# Patient Record
Sex: FEMALE | Race: WHITE | URBAN - METROPOLITAN AREA
[De-identification: names, ages, dates, MRNs, and addresses within clinical notes are randomized per-mention and may not be internally consistent; named-entity substitution may affect disease eponyms.]

---

## 2018-04-17 ENCOUNTER — TELEPHONE (OUTPATIENT)
Dept: ORTHOPEDICS | Facility: CLINIC | Age: 73
End: 2018-04-17

## 2018-04-17 NOTE — TELEPHONE ENCOUNTER
Patient was called to schedule an appointment with Dr. Thomas.  She was unavailable so a message was left to call [phone] option 3 and ask for Kirsten with Dr. Thomas's team.    A possible appointment time would be 5/1/2018 at 3:00pm    1:55pm  Patient called back and accepted the 5/1/2018 at 3:00pm appointment with Dr. Thomas at the .  She states that she has appointments at [facility] with Dr. Thomas and Dr. Buchanan.  Dr. Thomas's team at [facility] will be contacted to cancel those appointments at the patients request.  She was given our address and she has our phone number for any future questions.  The patient was agreeable with this course of action.

## 2018-04-30 ENCOUNTER — PRE VISIT (OUTPATIENT)
Dept: ORTHOPEDICS | Facility: CLINIC | Age: 73
End: 2018-04-30

## 2018-04-30 DIAGNOSIS — Z98.890 S/P LEFT KNEE SURGERY: Primary | ICD-10-CM

## 2018-04-30 NOTE — TELEPHONE ENCOUNTER
Patient is s/p left TKA on 9/3/2015.    Patient was last seen on 1/27/2016 by Dr. Allison at [facility].    Patient is coming to clinic for further discussion regarding plan of care.      Per standing orders left knee lateral and 20 degree and long leg alignment radiographs have been ordered and scheduled.    Patient visit type and questionnaires have been reviewed and are correct for this appointment? Yes    Kirsten Escobedo ATC

## 2018-05-01 ENCOUNTER — RADIANT APPOINTMENT (OUTPATIENT)
Dept: GENERAL RADIOLOGY | Facility: CLINIC | Age: 73
End: 2018-05-01
Attending: ORTHOPAEDIC SURGERY
Payer: COMMERCIAL

## 2018-05-01 ENCOUNTER — OFFICE VISIT (OUTPATIENT)
Dept: ORTHOPEDICS | Facility: CLINIC | Age: 73
End: 2018-05-01
Payer: COMMERCIAL

## 2018-05-01 VITALS — WEIGHT: 156 LBS | HEIGHT: 65 IN | BODY MASS INDEX: 25.99 KG/M2

## 2018-05-01 DIAGNOSIS — Z98.890 S/P KNEE SURGERY: ICD-10-CM

## 2018-05-01 DIAGNOSIS — Z98.890 S/P LEFT KNEE SURGERY: ICD-10-CM

## 2018-05-01 DIAGNOSIS — Z96.652 S/P TOTAL KNEE ARTHROPLASTY, LEFT: Primary | ICD-10-CM

## 2018-05-01 DIAGNOSIS — Z96.652 S/P TOTAL KNEE ARTHROPLASTY, LEFT: ICD-10-CM

## 2018-05-01 LAB
APPEARANCE FLD: NORMAL
BASOPHILS # BLD AUTO: 0 10E9/L (ref 0–0.2)
BASOPHILS NFR BLD AUTO: 0.8 %
COLOR FLD: NORMAL
CRP SERPL-MCNC: <2.9 MG/L (ref 0–8)
CRYSTALS SNV MICRO: NORMAL
DIFFERENTIAL METHOD BLD: NORMAL
EOSINOPHIL # BLD AUTO: 0.1 10E9/L (ref 0–0.7)
EOSINOPHIL NFR BLD AUTO: 1.1 %
EOSINOPHIL NFR FLD MANUAL: 2 %
ERYTHROCYTE [DISTWIDTH] IN BLOOD BY AUTOMATED COUNT: 12.7 % (ref 10–15)
ERYTHROCYTE [SEDIMENTATION RATE] IN BLOOD BY WESTERGREN METHOD: 11 MM/H (ref 0–30)
GRAM STN SPEC: NORMAL
GRAM STN SPEC: NORMAL
HCT VFR BLD AUTO: 40.2 % (ref 35–47)
HGB BLD-MCNC: 13.3 G/DL (ref 11.7–15.7)
IMM GRANULOCYTES # BLD: 0 10E9/L (ref 0–0.4)
IMM GRANULOCYTES NFR BLD: 0.2 %
LYMPHOCYTES # BLD AUTO: 2.2 10E9/L (ref 0.8–5.3)
LYMPHOCYTES NFR BLD AUTO: 40.9 %
LYMPHOCYTES NFR FLD MANUAL: 34 %
Lab: NORMAL
MCH RBC QN AUTO: 31.7 PG (ref 26.5–33)
MCHC RBC AUTO-ENTMCNC: 33.1 G/DL (ref 31.5–36.5)
MCV RBC AUTO: 96 FL (ref 78–100)
MONOCYTES # BLD AUTO: 0.5 10E9/L (ref 0–1.3)
MONOCYTES NFR BLD AUTO: 10 %
MONOS+MACROS NFR FLD MANUAL: 38 %
NEUTROPHILS # BLD AUTO: 2.5 10E9/L (ref 1.6–8.3)
NEUTROPHILS NFR BLD AUTO: 47 %
NEUTS BAND NFR FLD MANUAL: 26 %
NRBC # BLD AUTO: 0 10*3/UL
NRBC BLD AUTO-RTO: 0 /100
PLATELET # BLD AUTO: 267 10E9/L (ref 150–450)
RBC # BLD AUTO: 4.19 10E12/L (ref 3.8–5.2)
SPECIMEN SOURCE FLD: NORMAL
SPECIMEN SOURCE: NORMAL
WBC # BLD AUTO: 5.3 10E9/L (ref 4–11)
WBC # FLD AUTO: 856 /UL

## 2018-05-01 RX ORDER — AMOXICILLIN 500 MG
1200 CAPSULE ORAL DAILY
COMMUNITY

## 2018-05-01 RX ORDER — LIDOCAINE HYDROCHLORIDE 10 MG/ML
3 INJECTION, SOLUTION INFILTRATION; PERINEURAL ONCE
Qty: 3 ML | Refills: 0 | OUTPATIENT
Start: 2018-05-01 | End: 2018-05-01

## 2018-05-01 RX ORDER — ROSUVASTATIN CALCIUM 10 MG/1
10 TABLET, COATED ORAL DAILY
COMMUNITY

## 2018-05-01 RX ORDER — VITAMIN B COMPLEX
TABLET ORAL
COMMUNITY

## 2018-05-01 RX ORDER — TRAZODONE HYDROCHLORIDE 50 MG/1
50 TABLET, FILM COATED ORAL AT BEDTIME
COMMUNITY

## 2018-05-01 ASSESSMENT — ENCOUNTER SYMPTOMS
NAUSEA: 0
RECTAL PAIN: 0
ABDOMINAL PAIN: 0
POOR WOUND HEALING: 0
ARTHRALGIAS: 1
LIGHT-HEADEDNESS: 1
NECK PAIN: 1
CHILLS: 0
DIARRHEA: 1
BLOATING: 0
DEPRESSION: 0
INSOMNIA: 1
MYALGIAS: 1
DECREASED CONCENTRATION: 0
HEARTBURN: 1
ORTHOPNEA: 0
WEIGHT LOSS: 0
POLYPHAGIA: 0
BOWEL INCONTINENCE: 0
NIGHT SWEATS: 1
SLEEP DISTURBANCES DUE TO BREATHING: 0
EXERCISE INTOLERANCE: 0
PANIC: 1
BLOOD IN STOOL: 0
SYNCOPE: 0
HYPOTENSION: 0
VOMITING: 0
STIFFNESS: 0
JAUNDICE: 0
PALPITATIONS: 1
NERVOUS/ANXIOUS: 1
HYPERTENSION: 0
NAIL CHANGES: 0
MUSCLE WEAKNESS: 0
MUSCLE CRAMPS: 1
BACK PAIN: 0
WEIGHT GAIN: 0
DECREASED APPETITE: 0
SKIN CHANGES: 0
CONSTIPATION: 1
FATIGUE: 0
FEVER: 0

## 2018-05-01 NOTE — NURSING NOTE
"Reason For Visit:   Chief Complaint   Patient presents with     RECHECK     left knee swelling and pain. s/p left TKA 9/3/15.       Primary MD: Magaly Hillman  Referring MD: TYRONE Vaughan pt    ?  No    Occupation: Retired.  Currently working? No.  Work status?  Retired.    Date of injury: None  Type of injury: Ongoing.    Date of surgery: 9/3/15  Type of surgery: Total knee arthoplasty left    Date of surgery: 5/10/09  Type of surgery:   1.  Right total knee arthroplasty using [employer] triathlon system.   2.  Subtotal synovectomy with biopsies (path pending)    3.  Removal of anterior varicosities x3.     Smoker: No    Ht 1.645 m (5' 4.75\")  Wt 70.8 kg (156 lb)  BMI 26.16 kg/m2    Pain Assessment  Patient Currently in Pain: Yes  0-10 Pain Scale: 7  Primary Pain Location: Knee  Pain Orientation: Left  Pain Descriptors: Aching, Stabbing (Swollen)  Alleviating Factors: Ice  Aggravating Factors: Walking, Standing, Stairs (Increase activity)    Azalea Lozano CMA  5/1/2018      Teaching Flowsheet   Relevant Diagnosis: s/p left total knee arthroplasty  Teaching Topic: left knee aspiration     Person(s) involved in teaching:   Patient     Motivation Level:  Asks Questions: Yes  Eager to Learn: Yes  Cooperative: Yes  Receptive (willing/able to accept information): Yes  Any cultural factors/Oriental orthodox beliefs that may influence understanding or compliance? No       Patient demonstrates understanding of the following:  Reason for the appointment, diagnosis and treatment plan: Yes  Knowledge of proper use of medications and conditions for which they are ordered (with special attention to potential side effects or drug interactions): NA  Which situations necessitate calling provider and whom to contact: Yes       Teaching Concerns Addressed:        Proper use and care of NA (medical equip, care aids, etc.): NA  Nutritional needs and diet plan: NA  Pain management techniques: NA  Wound Care: NA  How and/when to " access community resources: FRANK     Instructional Materials Used/Given: FRANK     [facility]  [address] [address]  Dept: [phone]  ______________________________________________________________________________    Patient: Carmita Gautam   : 1945   MRN: [id number]   May 1, 2018    INVASIVE PROCEDURE SAFETY CHECKLIST    Date: 2018   Procedure:Left Knee Aspiration  Patient Name: Carmita Gautam  MRN: [id number]  YOB: 1945    Action: Complete sections as appropriate. Any discrepancy results in a HARD COPY until resolved.     PRE PROCEDURE:  Patient ID verified with 2 identifiers (name and  or MRN): Yes  Procedure and site verified with patient/designee (when able): Yes  Accurate consent documentation in medical record: Yes  H&P (or appropriate assessment) documented in medical record: Yes  H&P must be up to 20 days prior to procedure and updates within 24 hours of procedure as applicable: Yes  Relevant diagnostic and radiology test results appropriately labeled and displayed as applicable: Yes  Procedure site(s) marked with provider initials: Yes    TIMEOUT:  Time-Out performed immediately prior to starting procedure, including verbal and active participation of all team members addressing the following:Yes  * Correct patient identify  * Confirmed that the correct side and site are marked  * An accurate procedure consent form  * Agreement on the procedure to be done  * Correct patient position  * Relevant images and results are properly labeled and appropriately displayed  * The need to administer antibiotics or fluids for irrigation purposes during the procedure as applicable   * Safety precautions based on patient history or medication use    DURING PROCEDURE: Verification of correct person, site, and procedures any time the responsibility for care of the patient is transferred to another member of the care team.        The following medication was given:     MEDICATION:  Lidocaine without epinephrine  ROUTE: Intra-Articular  SITE: Left Knee  DOSE: 3 mL  LOT #: [id number]  : Oxatis  EXPIRATION DATE: 09/2021  NDC#: 39600-764-62   Was there drug waste? Yes  Amount of drug waste (mL): 27.  Reason for waste:  Single use vial      Azalea Lozano  May 1, 2018

## 2018-05-01 NOTE — MR AVS SNAPSHOT
After Visit Summary   5/1/2018    Carmita Gautam    MRN: [id number]           Patient Information     Date Of Birth          1945        Visit Information        Provider Department      5/1/2018 3:00 PM Vaishali Thomas MD [facility]        Today's Diagnoses     S/P total knee arthroplasty, left    -  1    S/P knee surgery           Follow-ups after your visit        Your next 10 appointments already scheduled     May 08, 2018  1:45 PM CDT   MR KNEE LEFT W/O CONTRAST with [id number]   [facility] ([facility])    [address] [address] [phone]           Take your medicines as usual, unless your doctor tells you not to. Bring a list of your current medicines to your exam (including vitamins, minerals and over-the-counter drugs). Also bring the results of similar scans you may have had.  Please remove any body piercings and hair extensions before you arrive.  Follow your doctor s orders. If you do not, we may have to postpone your exam.  You may or may not receive IV contrast for this exam pending the discretion of the Radiologist.  You do not need to do anything special to prepare.  The MRI machine uses a strong magnet. Please wear clothes without metal (snaps, zippers). A sweatsuit works well, or we may give you a hospital gown.   **IMPORTANT** THE INSTRUCTIONS BELOW ARE ONLY FOR THOSE PATIENTS WHO HAVE BEEN PRESCRIBED SEDATION OR GENERAL ANESTHESIA DURING THEIR MRI PROCEDURE:  IF YOUR DOCTOR PRESCRIBED ORAL SEDATION (take medicine to help you relax during your exam):   You must get the medicine from your doctor (oral medication) before you arrive. Bring the medicine to the exam. Do not take it at home. You ll be told when to take it upon arriving for your exam.   Arrive one hour early. Bring someone who can take you home after the test. Your medicine will make you sleepy. After the exam,  you may not drive, take a bus or take a taxi by yourself.  IF YOUR DOCTOR PRESCRIBED IV SEDATION:   Arrive one hour early. Bring someone who can take you home after the test. Your medicine will make you sleepy. After the exam, you may not drive, take a bus or take a taxi by yourself.   No eating 6 hours before your exam. You may have clear liquids up until 4 hours before your exam. (Clear liquids include water, clear tea, black coffee and fruit juice without pulp.)  IF YOUR DOCTOR PRESCRIBED ANESTHESIA (be asleep for your exam):   Arrive 1 1/2 hours early. Bring someone who can take you home after the test. You may not drive, take a bus or take a taxi by yourself.   No eating 8 hours before your exam. You may have clear liquids up until 4 hours before your exam. (Clear liquids include water, clear tea, black coffee and fruit juice without pulp.)   You will spend four to five hours in the recovery room.  Please call the Imaging Department at your exam site with any questions.              Who to contact     Please call your clinic at [phone] to:    Ask questions about your health    Make or cancel appointments    Discuss your medicines    Learn about your test results    Speak to your doctor            Additional Information About Your Visit        MyChart Information     [employer] is an electronic gateway that provides easy, online access to your medical records. With Dashlane, you can request a clinic appointment, read your test results, renew a prescription or communicate with your care team.     To sign up for [employer] visit the website at [email or web address]   You will be asked to enter the access code listed below, as well as some personal information. Please follow the directions to create your username and password.     Your access code is: [email or web address]  Expires: 2018  6:30 AM     Your access code will  in 90 days. If you need help or a new code, please contact your [facility]  [facility] or call [phone] for assistance.        Care EveryWhere ID     This is your Care EveryWhere ID. This could be used by other organizations to access your [facility] medical records  [id number]        Your Vitals Were     Height BMI (Body Mass Index)                1.645 m (5' 4.75\") 26.16 kg/m2           Blood Pressure from Last 3 Encounters:   09/07/15 110/49    Weight from Last 3 Encounters:   05/01/18 70.8 kg (156 lb)   09/03/15 73.1 kg (161 lb 3.2 oz)              We Performed the Following     Anaerobic bacterial culture     Cell count with differential fluid     Crystal ID synovial fluid     Fluid Culture Aerobic Bacterial     Gram stain     Large Joint/Bursa injection and/or drainage - Unilateral (Shoulder, Knee) [20610]          Today's Medication Changes          These changes are accurate as of 5/1/18 11:59 PM.  If you have any questions, ask your nurse or doctor.               Start taking these medicines.        Dose/Directions    lidocaine 1 % injection   Used for:  S/P total knee arthroplasty, left   Started by:  Vaishali Thomas MD        Dose:  3 mL   3 mLs by INTRA-ARTICULAR route once for 1 dose   Quantity:  3 mL   Refills:  0            Where to get your medicines      Some of these will need a paper prescription and others can be bought over the counter.  Ask your nurse if you have questions.     You don't need a prescription for these medications     lidocaine 1 % injection                Primary Care Provider Office Phone # Fax #    Magaly Hillman [phone]       [address] [address]        Equal Access to Services     TJ GRANDE AH: Hadyash crooks Sonela, waaxda luqadaha, qaybta kaalmada sergey garsia. So [facility] [phone].    ATENCIÓN: Si habla español, tiene a farmer disposición servicios gratuitos de asistencia lingüística. Llame al [phone].    We " comply with applicable federal civil rights laws and [place] laws. We do not discriminate on the basis of race, color, national origin, age, disability, sex, sexual orientation, or gender identity.            Thank you!     Thank you for choosing [facility]  for your care. Our goal is always to provide you with excellent care. Hearing back from our patients is one way we can continue to improve our services. Please take a few minutes to complete the written survey that you may receive in the mail after your visit with us. Thank you!             Your Updated Medication List - Protect others around you: Learn how to safely use, store and throw away your medicines at www.disposemymeds.org.          This list is accurate as of 5/1/18 11:59 PM.  Always use your most recent med list.                   Brand Name Dispense Instructions for use Diagnosis    acetaminophen 650 MG 8 hour tablet     100 tablet    Take 650 mg by mouth every 6 hours    Knee joint replacement status       ASPIRIN PO      Take 325 mg by mouth daily        ATIVAN 1 MG tablet   Generic drug:  LORazepam      Take 1 mg by mouth every 6 hours as needed for anxiety        bisacodyl 5 MG EC tablet    DULCOLAX     Take 5 mg by mouth daily as needed for constipation        CALCIUM 600+D 600-200 MG-UNIT Tabs   Generic drug:  calcium carbonate-vitamin D           CoQ10 100 MG Caps       S/P total knee arthroplasty, left       docusate sodium 100 MG capsule    COLACE     Take 100 mg by mouth 2 times daily as needed for constipation        FIBER PO      Take 1 tablet by mouth daily    S/P total knee arthroplasty, left       fish Oil 1200 MG capsule      Take 1,200 mg by mouth daily    S/P total knee arthroplasty, left       GAVISCON EXTRA STRENGTH 160-105 MG Chew   Generic drug:  Alum hydroxide-Mag carbonate      Take by mouth At Bedtime        hydrOXYzine 10 MG tablet    ATARAX    60 tablet    Take 1 tablet (10 mg) by mouth every 6 hours as  needed for other (adjuvant pain)    Knee joint replacement status       lidocaine 1 % injection     3 mL    3 mLs by INTRA-ARTICULAR route once for 1 dose    S/P total knee arthroplasty, left       magnesium 250 MG tablet      Take 1 tablet by mouth daily        * omeprazole 40 MG capsule    priLOSEC     Take 40 mg by mouth daily        * OMEPRAZOLE PO      Take 20 mg by mouth        oxyCODONE IR 10 MG tablet    ROXICODONE    100 tablet    Take 1-1.5 tablets (10-15 mg) by mouth every 3 hours as needed for moderate to severe pain    Knee joint replacement status       PANTOPRAZOLE SODIUM PO      Take 40 mg by mouth 2 times daily    S/P total knee arthroplasty, left       PAXIL PO      Take 20 mg by mouth daily        rosuvastatin 10 MG tablet    CRESTOR     Take 10 mg by mouth daily    S/P total knee arthroplasty, left       senna-docusate 8.6-50 MG per tablet    SENOKOT-S;PERICOLACE    60 tablet    Take 1-2 tablets by mouth 2 times daily as needed for constipation    Knee joint replacement status       SIMVASTATIN PO      Take 40 mg by mouth At Bedtime        SYNTHROID PO      Take 200 mcg by mouth daily        traZODone 50 MG tablet    DESYREL     Take 50 mg by mouth At Bedtime    S/P total knee arthroplasty, left       VITAMIN D (CHOLECALCIFEROL) PO      Take 1,000 Units by mouth daily    S/P total knee arthroplasty, left       * warfarin 5 MG tablet    COUMADIN    7 tablet    Take 1 tablet (5 mg) by mouth daily    Knee joint replacement status       * warfarin 4 MG tablet    COUMADIN    15 tablet    Take 1 tablet (4 mg) by mouth daily    Knee joint replacement status       * warfarin 4 MG tablet    COUMADIN    30 tablet    Take 1 tablet (4 mg) by mouth daily Or as directed by coumadin clinic.    Status post total left knee replacement       * Notice:  This list has 5 medication(s) that are the same as other medications prescribed for you. Read the directions carefully, and ask your doctor or other care provider  to review them with you.

## 2018-05-01 NOTE — LETTER
5/1/2018       RE: Carmita Gautam  [address]   [place] [address]     Dear Colleague,    Thank you for referring your patient, Carmita Gautam, to the [facility] at [facility]. Please see a copy of my visit note below.    HISTORY OF PRESENT ILLNESS:  Patient is a 73-year-old female who has never completely liked her left knee.  She is status post a left total knee replacement in 09/2015.  She has been plagued with intermittent swelling on and off since that time.  All of her previous notes are at [facility] which I do not have direct access to, but I know her well.      Interestingly, she is status post a right total knee in 05/2009.  At that time she had a subtotal synovectomy with multiple biopsies taken.  The biopsies came back hemosiderin laden synovium that did not fit the pathologic criteria of PVNS.  She has done reasonably well with her right side.      The reason for me seeing her today is that she reports that she was stepping out of a car and made a turn and her knee just blew up within minutes.  That happened approximately a few weeks ago.  She is here for further discussion.      It is true that on one other occasion, I did aspirate her knee and injected it with steroids since her total knee was done in 09/2015.  Again, I do not have full access to the [facility] notes, but that appeared to be serous fluid to my recollection, and her knee did improve with that injection.      PHYSICAL EXAMINATION:  On today's knee reveals a knee that is boggy with a positive fluid wave.  Good straight leg raising effort without a lag, range of motion 0-120.      Kneecap tracks well through an active arc of motion.      IMAGING:  X-rays were taken today.  They show no interval change with previous x-rays.      ASSESSMENT:  I felt that the next step would be to aspirate the knee.  If it remained serous fluid, I would consider injecting steroids but I do  believe that we need a workup for inflammation as well.      PROCEDURE:  Under sterile conditions, patient's knee was aspirated for 50 cc of red fluid that did not clot, so consideration for this being fluid that is suggestive of PVNS was considered.  This was sent for Gram stain, cultures, cell count.      In addition, we obtained blood work, a CRP and sed rate.  The CRP came back as within normal limits of under 2.9.  The sed rate came back as 11, which is within normal limits.  The CBC with diff was all within normal limits.      The results of the fluid are forthcoming.      In light of this aspiration, we did not inject her with steroids.      After discussing this with Dr. Carcamo, we will obtain an MRI of her left knee.  This will be an MRI looking for potential synovitis, in particular PVNS. The MRI should go in mid-thigh of the left knee.  Due to her total knee in place, it should be with a metal subtraction as much as possible.      She will expect a phone call after this is done.  [place] 2 [place].      Room time 25 minutes, consultation time 20.     Answers for HPI/ROS submitted by the patient on 5/1/2018     Again, thank you for allowing me to participate in the care of your patient.      Sincerely,    Vaishali Thomas MD

## 2018-05-02 NOTE — PROGRESS NOTES
HISTORY OF PRESENT ILLNESS:  Patient is a 73-year-old female who has never completely liked her left knee.  She is status post a left total knee replacement in 09/2015.  She has been plagued with intermittent swelling on and off since that time.  All of her previous notes are at [facility] which I do not have direct access to, but I know her well.      Interestingly, she is status post a right total knee in 05/2009.  At that time she had a subtotal synovectomy with multiple biopsies taken.  The biopsies came back hemosiderin laden synovium that did not fit the pathologic criteria of PVNS.  She has done reasonably well with her right side.      The reason for me seeing her today is that she reports that she was stepping out of a car and made a turn and her knee just blew up within minutes.  That happened approximately a few weeks ago.  She is here for further discussion.      It is true that on one other occasion, I did aspirate her knee and injected it with steroids since her total knee was done in 09/2015.  Again, I do not have full access to the [facility] notes, but that appeared to be serous fluid to my recollection, and her knee did improve with that injection.      PHYSICAL EXAMINATION:  On today's knee reveals a knee that is boggy with a positive fluid wave.  Good straight leg raising effort without a lag, range of motion 0-120.      Kneecap tracks well through an active arc of motion.      IMAGING:  X-rays were taken today.  They show no interval change with previous x-rays.      ASSESSMENT:  I felt that the next step would be to aspirate the knee.  If it remained serous fluid, I would consider injecting steroids but I do believe that we need a workup for inflammation as well.      PROCEDURE:  Under sterile conditions, patient's knee was aspirated for 50 cc of red fluid that did not clot, so consideration for this being fluid that is suggestive of PVNS was considered.  This was sent for Gram stain, cultures, cell count.       In addition, we obtained blood work, a CRP and sed rate.  The CRP came back as within normal limits of under 2.9.  The sed rate came back as 11, which is within normal limits.  The CBC with diff was all within normal limits.      The results of the fluid are forthcoming.      In light of this aspiration, we did not inject her with steroids.      After discussing this with Dr. Carcamo, we will obtain an MRI of her left knee.  This will be an MRI looking for potential synovitis, in particular PVNS. The MRI should go in mid-thigh of the left knee.  Due to her total knee in place, it should be with a metal subtraction as much as possible.      She will expect a phone call after this is done.  She knows [place] am [place] of [place] the next 2 weeks.      Room time 25 minutes, consultation time 20.       Answers for HPI/ROS submitted by the patient on 5/1/2018   General Symptoms: Yes  Skin Symptoms: Yes  HENT Symptoms: No  EYE SYMPTOMS: No  HEART SYMPTOMS: Yes  LUNG SYMPTOMS: No  INTESTINAL SYMPTOMS: Yes  URINARY SYMPTOMS: No  GYNECOLOGIC SYMPTOMS: No  BREAST SYMPTOMS: No  SKELETAL SYMPTOMS: Yes  BLOOD SYMPTOMS: No  NERVOUS SYSTEM SYMPTOMS: No  MENTAL HEALTH SYMPTOMS: Yes  Fever: No  Loss of appetite: No  Weight loss: No  Weight gain: No  Fatigue: No  Night sweats: Yes  Chills: No  Increased stress: No  Excessive hunger: No  Changes in hair: No  Changes in moles/birth marks: No  Itching: Yes  Rashes: No  Changes in nails: No  Acne: No  Hair in places you don't want it: No  Change in facial hair: No  Warts: No  Non-healing sores: No  Scarring: No  Flaking of skin: No  Color changes of hands/feet in cold : Yes  Sun sensitivity: No  Skin thickening: No  Chest pain or pressure: Yes  Fast or irregular heartbeat: Yes  Trouble breathing while lying down: No  Fingers or toes appear blue: No  High blood pressure: No  Low blood pressure: No  Fainting: No  Murmurs: No  Pacemaker: No  Varicose veins: Yes  Edema or swelling:  Yes  Wake up at night with shortness of breath: No  Light-headedness: Yes  Exercise intolerance: No  Heart burn or indigestion: Yes  Nausea: No  Vomiting: No  Abdominal pain: No  Bloating: No  Constipation: Yes  Diarrhea: Yes  Blood in stool: No  Black stools: No  Rectal or Anal pain: No  Fecal incontinence: No  Yellowing of skin or eyes: No  Vomit with blood: No  Change in stools: Yes  Back pain: No  Muscle aches: Yes  Neck pain: Yes  Joint pain: Yes  Muscle cramps: Yes  Muscle weakness: No  Joint stiffness: No  Bone fracture: No  Nervous or Anxious: Yes  Depression: No  Trouble sleeping: Yes  Trouble thinking or concentrating: No  Mood changes: No  Panic attacks: Yes

## 2018-05-03 ENCOUNTER — TELEPHONE (OUTPATIENT)
Dept: ORTHOPEDICS | Facility: CLINIC | Age: 73
End: 2018-05-03

## 2018-05-03 NOTE — TELEPHONE ENCOUNTER
Called patient to give her lab results from 5/1/2018.  Dr. Thomas also discussed patient with Dr. Lucero and decided to order MRI for patient for her Left knee.  Order placed.  Patient given scheduling phone number and will schedule this.  We will review results when completed and call with plan of action.

## 2018-05-06 LAB
BACTERIA SPEC CULT: NO GROWTH
Lab: NORMAL
SPECIMEN SOURCE: NORMAL

## 2018-05-08 ENCOUNTER — RADIANT APPOINTMENT (OUTPATIENT)
Dept: MRI IMAGING | Facility: CLINIC | Age: 73
End: 2018-05-08
Attending: ORTHOPAEDIC SURGERY
Payer: COMMERCIAL

## 2018-05-08 DIAGNOSIS — Z96.652 S/P TOTAL KNEE ARTHROPLASTY, LEFT: ICD-10-CM

## 2018-05-15 LAB
BACTERIA SPEC CULT: NORMAL
Lab: NORMAL
SPECIMEN SOURCE: NORMAL

## 2018-05-16 ENCOUNTER — TELEPHONE (OUTPATIENT)
Dept: ORTHOPEDICS | Facility: CLINIC | Age: 73
End: 2018-05-16

## 2018-05-16 NOTE — TELEPHONE ENCOUNTER
Voicemail left for patient regarding an appointment for her to see Dr. Thomas next Tuesday, May 22, 2018.  I have asked her to call back and confirm that 10am would work for her to come in to review MRI results and possible steroid injection.

## 2018-05-17 ENCOUNTER — TELEPHONE (OUTPATIENT)
Dept: ORTHOPEDICS | Facility: CLINIC | Age: 73
End: 2018-05-17

## 2018-05-17 NOTE — TELEPHONE ENCOUNTER
Spoke with patient and confirmed that appointment on Tuesday, May 22, 2018 at 10am for patient to come in and review MRI and get possible knee injection works for patient.  She has our phone number for any further questions or concerns.

## 2018-05-18 ENCOUNTER — PRE VISIT (OUTPATIENT)
Dept: ORTHOPEDICS | Facility: CLINIC | Age: 73
End: 2018-05-18

## 2018-05-18 NOTE — TELEPHONE ENCOUNTER
Patient is s/p left TKA on 9/3/2015.    Patient was last seen on 5/1/2018.    Patient is coming to clinic for further discussion regarding plan of care.  Review MRI and possible left knee injection    No additional orders are needed this visit.     Patient visit type and questionnaires have been reviewed and are correct for this appointment? Yes    Kirsten Escobedo ATC

## 2018-05-22 ENCOUNTER — OFFICE VISIT (OUTPATIENT)
Dept: ORTHOPEDICS | Facility: CLINIC | Age: 73
End: 2018-05-22
Payer: COMMERCIAL

## 2018-05-22 ENCOUNTER — DOCUMENTATION ONLY (OUTPATIENT)
Dept: ORTHOPEDICS | Facility: CLINIC | Age: 73
End: 2018-05-22

## 2018-05-22 DIAGNOSIS — M65.962 SYNOVITIS OF LEFT KNEE: Primary | ICD-10-CM

## 2018-05-22 RX ORDER — TRIAMCINOLONE ACETONIDE 40 MG/ML
40 INJECTION, SUSPENSION INTRA-ARTICULAR; INTRAMUSCULAR ONCE
Qty: 1 ML | Refills: 0 | OUTPATIENT
Start: 2018-05-22 | End: 2018-05-22

## 2018-05-22 RX ORDER — LIDOCAINE HYDROCHLORIDE 10 MG/ML
9 INJECTION, SOLUTION INFILTRATION; PERINEURAL ONCE
Qty: 9 ML | Refills: 0 | OUTPATIENT
Start: 2018-05-22 | End: 2018-05-22

## 2018-05-22 ASSESSMENT — ENCOUNTER SYMPTOMS
INCREASED ENERGY: 0
FEVER: 0
ABDOMINAL PAIN: 0
BLOOD IN STOOL: 0
POLYPHAGIA: 0
STIFFNESS: 1
DOUBLE VISION: 0
MUSCLE WEAKNESS: 0
MUSCLE CRAMPS: 1
ORTHOPNEA: 0
FATIGUE: 0
ARTHRALGIAS: 1
DIARRHEA: 0
HYPOTENSION: 0
DECREASED APPETITE: 0
RECTAL PAIN: 0
EYE PAIN: 0
EYE WATERING: 0
PALPITATIONS: 0
BACK PAIN: 0
MYALGIAS: 0
VOMITING: 0
HYPERTENSION: 0
WEIGHT LOSS: 0
NECK PAIN: 0
ALTERED TEMPERATURE REGULATION: 0
EYE IRRITATION: 0
JOINT SWELLING: 0
NIGHT SWEATS: 1
BOWEL INCONTINENCE: 0
LEG PAIN: 1
BLOATING: 1
HEARTBURN: 1
CONSTIPATION: 0
HALLUCINATIONS: 0
CHILLS: 0
JAUNDICE: 0
SYNCOPE: 0
EXERCISE INTOLERANCE: 0
LIGHT-HEADEDNESS: 1
EYE REDNESS: 1
WEIGHT GAIN: 0
POLYDIPSIA: 0
SLEEP DISTURBANCES DUE TO BREATHING: 0
NAUSEA: 0

## 2018-05-22 NOTE — PROGRESS NOTES
HISTORY OF PRESENT ILLNESS:  Patient is a 73-year-old female who has had chronic swelling of her left knee.  She is status post a total knee replacement on the left side in 2015.  When I removed fluid a week ago, it was a bloody, but did not clot.  Consideration for PVNS was discussed.  On the basis of Dr. Carcamo's recommendation, an MRI was obtained.  The MRI suggests chronic synovitis which they read as mild, but with a moderate effusion.      What is interesting in her history is she had recurrent effusions in her right knee.  She underwent a right total knee replacement in 2009.  At that time her synovium did look hemosiderin laden but not villous.  Multiple biopsies were obtained and the pathology report did confirm hemosiderin-laden synovitis but did not fit the true criteria of PVNS due to lack of nodularity.  That right knee has done well since that time.      The patient does not take much in terms of nonsteroidals, an occasional Motrin or Naprosyn once or twice a week; however, she does take 325 mg of aspirin a day.  This was based on recommendation by her primary care physician due to potential precursor of stroke as viewed by spots on her brain and on an MRI.  She does have 2 sisters who have also had strokes.  One is 94 years old.      Based on this, I would recommend that we scope her knee, biopsy the synovium, and do a subtotal synovectomy.  I would not recommend that she get off aspirin and I would recommend that we do the surgery with aspirin present.      This was discussed with her in the presence of her .      Because she has several weeks where she is trying to get her house ready to host her granddaughter's graduation from high school party,  she is asking to inject her knee with steroids today.  This has helped in the past and I am in agreement with that.      PROCEDURE:  Under sterile conditions, patient's left knee was injected with 10 cc of lidocaine and 1 cc of Kenalog 40 mg per  mL.  The patient had improved pain relief upon leaving the clinic.  Will follow up with me at the time of surgery.       Answers for HPI/ROS submitted by the patient on 5/22/2018   General Symptoms: Yes  Skin Symptoms: No  HENT Symptoms: No  EYE SYMPTOMS: Yes  HEART SYMPTOMS: Yes  LUNG SYMPTOMS: No  INTESTINAL SYMPTOMS: Yes  URINARY SYMPTOMS: No  GYNECOLOGIC SYMPTOMS: No  BREAST SYMPTOMS: No  SKELETAL SYMPTOMS: Yes  BLOOD SYMPTOMS: No  NERVOUS SYSTEM SYMPTOMS: No  MENTAL HEALTH SYMPTOMS: No  Fever: No  Loss of appetite: No  Weight loss: No  Weight gain: No  Fatigue: No  Night sweats: Yes  Chills: No  Increased stress: No  Excessive hunger: No  Excessive thirst: No  Feeling hot or cold when others believe the temperature is normal: No  Loss of height: No  Post-operative complications: No  Surgical site pain: Yes  Hallucinations: No  Change in or Loss of Energy: No  Hyperactivity: No  Confusion: No  Eye pain: No  Vision loss: No  Dry eyes: No  Watery eyes: No  Eye bulging: No  Double vision: No  Flashing of lights: No  Spots: No  Floaters: Yes  Redness: Yes  Crossed eyes: No  Tunnel Vision: No  Yellowing of eyes: No  Eye irritation: No  Fast or irregular heartbeat: No  Pain in legs with walking: Yes  Trouble breathing while lying down: No  Fingers or toes appear blue: No  High blood pressure: No  Low blood pressure: No  Fainting: No  Murmurs: No  Pacemaker: No  Varicose veins: Yes  Wake up at night with shortness of breath: No  Light-headedness: Yes  Exercise intolerance: No  Heart burn or indigestion: Yes  Nausea: No  Vomiting: No  Abdominal pain: No  Bloating: Yes  Constipation: No  Diarrhea: No  Blood in stool: No  Black stools: No  Rectal or Anal pain: No  Fecal incontinence: No  Yellowing of skin or eyes: No  Vomit with blood: No  Change in stools: No  Back pain: No  Muscle aches: No  Neck pain: No  Swollen joints: No  Joint pain: Yes  Bone pain: Yes  Muscle cramps: Yes  Muscle weakness: No  Joint stiffness:  Yes  Bone fracture: No

## 2018-05-22 NOTE — NURSING NOTE
Reason For Visit:   Chief Complaint   Patient presents with     RECHECK     follow up left knee MRI 5/8/18. Patient states left knee is still swollen.       Primary MD: Magaly Hillman  Referring MD: TYRONE Vaughan pt     ?  No     Occupation: Retired.  Currently working? No.  Work status?  Retired.     Date of injury: None  Type of injury: Ongoing.     Date of surgery: 9/3/15  Type of surgery: Total knee arthoplasty left     Date of surgery: 5/10/09  Type of surgery:   1.  Right total knee arthroplasty using [employer] system.   2.  Subtotal synovectomy with biopsies (path pending)    3.  Removal of anterior varicosities x3.     Smoker: No    There were no vitals taken for this visit.    Pain Assessment  Patient Currently in Pain: Yes  0-10 Pain Scale: 5  Primary Pain Location: Knee  Pain Orientation: Left  Pain Descriptors: Aching  Alleviating Factors: Ice  Aggravating Factors: Standing, Walking    Azalea Lozano CMA  5/22/2018      Teaching Flowsheet   Relevant Diagnosis: Left Knee Arthritis   Teaching Topic: Steroid Injection to Left Knee     Person(s) involved in teaching:   Patient     Motivation Level:  Asks Questions: Yes  Eager to Learn: Yes  Cooperative: Yes  Receptive (willing/able to accept information): Yes  Any cultural factors/Latter-day beliefs that may influence understanding or compliance? No     Patient demonstrates understanding of the following:  Reason for the appointment, diagnosis and treatment plan: Yes  Knowledge of proper use of medications and conditions for which they are ordered (with special attention to potential side effects or drug interactions): Yes  Which situations necessitate calling provider and whom to contact: Yes     Teaching Concerns Addressed: NA     Proper use and care of NA (medical equip, care aids, etc.): NA  Nutritional needs and diet plan: NA  Pain management techniques: NA  Wound Care: NA  How and/when to access community resources: NA      Instructional Materials Used/Given: Verbal Instructions           [facility]  [address] [address]  Dept: [phone]  ______________________________________________________________________________    Patient: Carmita Gautam   : 1945   MRN: [id number]   May 22, 2018    INVASIVE PROCEDURE SAFETY CHECKLIST    Date: 2018   Procedure: Steroid Injection to Left Knee  Patient Name: Carmita Gautam  MRN: [id number]  YOB: 1945    Action: Complete sections as appropriate. Any discrepancy results in a HARD COPY until resolved.     PRE PROCEDURE:  Patient ID verified with 2 identifiers (name and  or MRN): Yes  Procedure and site verified with patient/designee (when able): Yes  Accurate consent documentation in medical record: Yes  H&P (or appropriate assessment) documented in medical record: NA  H&P must be up to 20 days prior to procedure and updates within 24 hours of procedure as applicable: NA  Relevant diagnostic and radiology test results appropriately labeled and displayed as applicable: NA  Procedure site(s) marked with provider initials: NA    TIMEOUT:  Time-Out performed immediately prior to starting procedure, including verbal and active participation of all team members addressing the following:Yes  * Correct patient identify  * Confirmed that the correct side and site are marked  * An accurate procedure consent form  * Agreement on the procedure to be done  * Correct patient position  * Relevant images and results are properly labeled and appropriately displayed  * The need to administer antibiotics or fluids for irrigation purposes during the procedure as applicable   * Safety precautions based on patient history or medication use    DURING PROCEDURE: Verification of correct person, site, and procedures any time the responsibility for care of the patient is transferred to another member of the care team.            The following medications was given:     MEDICATION:  Kenalog 40mg/mL  ROUTE: Intra-Articular  SITE: Left Knee  DOSE: 1 mL  LOT #: [id number]  : [employer]  EXPIRATION DATE: 12/2019  NDC#: [address]-2   Was there drug waste? No    MEDICATION:  Lidocaine 1% without epinephrine  ROUTE:  Intra-Articular  SITE: Left Knee  DOSE: 9 mL  LOT #: [id number]  : [employer]  EXPIRATION DATE: 09/2021  NDC#: 16655-188-57   Was there drug waste? Yes  Amount of drug waste (mL): 21 mL.  Reason for waste:  Single use vial    Azalea Lozano CMA  May 22, 2018

## 2018-05-22 NOTE — PROGRESS NOTES
Confirmed surgery with patient for a Left knee subtotal synovialectomy with Dr Thomas on 6/8 at the [facility]. Gave patient packet and soap. Patient will get her pre op H&P done with her primary MD and will await the nurse phone call to touch base about surgery and answer any questions that she may have. Patient will also wait for the pre op nurse phone call in regards to arrival time for surgery. Patient is on a full aspirin daily and understands will need to be off that 10-14 days prior and the nurse will go over that also with her, she will let her primary MD know also. Patient understands and agrees with this plan.

## 2018-05-22 NOTE — LETTER
5/22/2018       RE: Carmita Gautam  [address]   [place] [address]     Dear Colleague,    Thank you for referring your patient, Carmita Gautam, to the [facility] at [facility]. Please see a copy of my visit note below.    HISTORY OF PRESENT ILLNESS:  Patient is a 73-year-old female who has had chronic swelling of her left knee.  She is status post a total knee replacement on the left side in 2015.  When I removed fluid a week ago, it was a bloody, but did not clot.  Consideration for PVNS was discussed.  On the basis of Dr. Carcamo's recommendation, an MRI was obtained.  The MRI suggests chronic synovitis which they read as mild, but with a moderate effusion.      What is interesting in her history is she had recurrent effusions in her right knee.  She underwent a right total knee replacement in 2009.  At that time her synovium did look hemosiderin laden but not villous.  Multiple biopsies were obtained and the pathology report did confirm hemosiderin-laden synovitis but did not fit the true criteria of PVNS due to lack of nodularity.  That right knee has done well since that time.      The patient does not take much in terms of nonsteroidals, an occasional Motrin or Naprosyn once or twice a week; however, she does take 325 mg of aspirin a day.  This was based on recommendation by her primary care physician due to potential precursor of stroke as viewed by spots on her brain and on an MRI.  She does have 2 sisters who have also had strokes.  One is 94 years old.      Based on this, I would recommend that we scope her knee, biopsy the synovium, and do a subtotal synovectomy.  I would not recommend that she get off aspirin and I would recommend that we do the surgery with aspirin present.      This was discussed with her in the presence of her .      Because she has several weeks where she is trying to get her house ready to host  her granddaughter's graduation from high school party,  she is asking to inject her knee with steroids today.  This has helped in the past and I am in agreement with that.      PROCEDURE:  Under sterile conditions, patient's left knee was injected with 10 cc of lidocaine and 1 cc of Kenalog 40 mg per mL.  The patient had improved pain relief upon leaving the clinic.  Will follow up with me at the time of surgery.     Again, thank you for allowing me to participate in the care of your patient.      Sincerely,    Vaishali Thomas MD

## 2018-05-22 NOTE — MR AVS SNAPSHOT
After Visit Summary   2018    Carmita Gautam    MRN: [id number]           Patient Information     Date Of Birth          1945        Visit Information        Provider Department      2018 10:00 AM Vaishali Thomas MD [facility]        Today's Diagnoses     Synovitis of left knee    -  1       Follow-ups after your visit        Your next 10 appointments already scheduled     2018   Procedure with Vaishali Thomas MD   [facility] ([facility])    [address] [place] [address]  5th [facility] [id number] [phone]           Located in the Clinics and Surgery Center at [address] [place] [address].   parking is very convenient and highly recommended.  is a $6 flat rate fee.  Both  and self parkers should enter the main arrival plaza from [facility]; parking attendants will direct you based on your parking preference.              Who to contact     Please call your clinic at [phone] to:    Ask questions about your health    Make or cancel appointments    Discuss your medicines    Learn about your test results    Speak to your doctor            Additional Information About Your Visit        MyChart Information     [email or web address] is an electronic gateway that provides easy, online access to your medical records. With [email or web address], you can request a clinic appointment, read your test results, renew a prescription or communicate with your care team.     To sign up for [email or web address] visit the website at [email or web address]   You will be asked to enter the access code listed below, as well as some personal information. Please follow the directions to create your username and password.     Your access code is: [email or web address]  Expires: 2018  6:30 AM     Your access code will  in 90 days. If you need help or a new code, please contact your [facility] or call [phone] for assistance.        Care EveryWhere ID     This is your Care EveryWhere ID. This could be used by other organizations to access your [facility] medical records  [id number]         Blood Pressure from Last 3 Encounters:   09/07/15 110/49    Weight from Last 3 Encounters:   05/01/18 70.8 kg (156 lb)   09/03/15 73.1 kg (161 lb 3.2 oz)              We Performed the Following     Large Joint/Bursa injection and/or drainage - Unilateral (Shoulder, Knee) [20610]     Emma-Operative Worksheet          Today's Medication Changes          These changes are accurate as of 5/22/18 11:59 PM.  If you have any questions, ask your nurse or doctor.               Start taking these medicines.        Dose/Directions    lidocaine 1 % injection   Used for:  Synovitis of left knee   Started by:  Vaishali Thomas MD        Dose:  9 mL   9 mLs by INTRA-ARTICULAR route once for 1 dose   Quantity:  9 mL   Refills:  0       triamcinolone acetonide 40 MG/ML injection   Commonly known as:  KENALOG   Used for:  Synovitis of left knee   Started by:  Vaishali Thomas MD        Dose:  40 mg   1 mL (40 mg) by INTRA-ARTICULAR route once for 1 dose   Quantity:  1 mL   Refills:  0            Where to get your medicines      Some of these will need a paper prescription and others can be bought over the counter.  Ask your nurse if you have questions.     You don't need a prescription for these medications     lidocaine 1 % injection    triamcinolone acetonide 40 MG/ML injection                Primary Care Provider Office Phone # Fax #    Magaly Hillman [phone]       [address] [address]        Equal Access to Services     [facility]: Esperanza Toledo, anshu chambers, sergey savage. So [facility] [phone].    ATENCIÓN: Si habla español, tiene a farmer disposición  servicios gratuitos de asistencia lingüística. Gali luna [phone].    We comply with applicable federal civil rights laws and [place] laws. We do not discriminate on the basis of race, color, national origin, age, disability, sex, sexual orientation, or gender identity.            Thank you!     Thank you for choosing [facility]  for your care. Our goal is always to provide you with excellent care. Hearing back from our patients is one way we can continue to improve our services. Please take a few minutes to complete the written survey that you may receive in the mail after your visit with us. Thank you!             Your Updated Medication List - Protect others around you: Learn how to safely use, store and throw away your medicines at www.disposemymeds.org.          This list is accurate as of 5/22/18 11:59 PM.  Always use your most recent med list.                   Brand Name Dispense Instructions for use Diagnosis    acetaminophen 650 MG 8 hour tablet     100 tablet    Take 650 mg by mouth every 6 hours    Knee joint replacement status       ASPIRIN PO      Take 325 mg by mouth daily        ATIVAN 1 MG tablet   Generic drug:  LORazepam      Take 1 mg by mouth every 6 hours as needed for anxiety        bisacodyl 5 MG EC tablet    DULCOLAX     Take 5 mg by mouth daily as needed for constipation        CALCIUM 600+D 600-200 MG-UNIT Tabs   Generic drug:  calcium carbonate-vitamin D           CoQ10 100 MG Caps       S/P total knee arthroplasty, left       docusate sodium 100 MG capsule    COLACE     Take 100 mg by mouth 2 times daily as needed for constipation        FIBER PO      Take 1 tablet by mouth daily    S/P total knee arthroplasty, left       fish Oil 1200 MG capsule      Take 1,200 mg by mouth daily    S/P total knee arthroplasty, left       GAVISCON EXTRA STRENGTH 160-105 MG Chew   Generic drug:  Alum hydroxide-Mag carbonate      Take by mouth At Bedtime        hydrOXYzine 10 MG  tablet    ATARAX    60 tablet    Take 1 tablet (10 mg) by mouth every 6 hours as needed for other (adjuvant pain)    Knee joint replacement status       lidocaine 1 % injection     9 mL    9 mLs by INTRA-ARTICULAR route once for 1 dose    Synovitis of left knee       magnesium 250 MG tablet      Take 1 tablet by mouth daily        * omeprazole 40 MG capsule    priLOSEC     Take 40 mg by mouth daily        * OMEPRAZOLE PO      Take 20 mg by mouth        oxyCODONE IR 10 MG tablet    ROXICODONE    100 tablet    Take 1-1.5 tablets (10-15 mg) by mouth every 3 hours as needed for moderate to severe pain    Knee joint replacement status       PANTOPRAZOLE SODIUM PO      Take 40 mg by mouth 2 times daily    S/P total knee arthroplasty, left       PAXIL PO      Take 20 mg by mouth daily        rosuvastatin 10 MG tablet    CRESTOR     Take 10 mg by mouth daily    S/P total knee arthroplasty, left       senna-docusate 8.6-50 MG per tablet    SENOKOT-S;PERICOLACE    60 tablet    Take 1-2 tablets by mouth 2 times daily as needed for constipation    Knee joint replacement status       SIMVASTATIN PO      Take 40 mg by mouth At Bedtime        SYNTHROID PO      Take 200 mcg by mouth daily        traZODone 50 MG tablet    DESYREL     Take 50 mg by mouth At Bedtime    S/P total knee arthroplasty, left       triamcinolone acetonide 40 MG/ML injection    KENALOG    1 mL    1 mL (40 mg) by INTRA-ARTICULAR route once for 1 dose    Synovitis of left knee       VITAMIN D (CHOLECALCIFEROL) PO      Take 1,000 Units by mouth daily    S/P total knee arthroplasty, left       * warfarin 5 MG tablet    COUMADIN    7 tablet    Take 1 tablet (5 mg) by mouth daily    Knee joint replacement status       * warfarin 4 MG tablet    COUMADIN    15 tablet    Take 1 tablet (4 mg) by mouth daily    Knee joint replacement status       * warfarin 4 MG tablet    COUMADIN    30 tablet    Take 1 tablet (4 mg) by mouth daily Or as directed by coumadin clinic.     Status post total left knee replacement       * Notice:  This list has 5 medication(s) that are the same as other medications prescribed for you. Read the directions carefully, and ask your doctor or other care provider to review them with you.

## 2018-05-24 ENCOUNTER — TELEPHONE (OUTPATIENT)
Dept: ORTHOPEDICS | Facility: CLINIC | Age: 73
End: 2018-05-24

## 2018-05-24 NOTE — TELEPHONE ENCOUNTER
Pre-op education done over the phone with patient.  She is scheduled on 6/8/18 with Dr. Thomas for left knee synovial biopsy, subtotal synovectomy    Patient aware of surgery location.  She was instructed to stop her aspirin prior to surgery but per Dr. Thomas's note she should continue to take this until surgery.  This will be clarified with Dr. Thomas and I will call the patient back with her recommendation.  I did instruct her to stop her fish oil.    All questions answered. She has our phone number for further questions/concerns.

## 2018-05-25 NOTE — TELEPHONE ENCOUNTER
Clarified with Dr. Thomas that patient should continue to take her 325mg aspirin until the day before surgery.  She should not take it the morning of surgery.  Voicemail left with this information for patient (Patient had OK'd to leave a message when I spoke with her yesterday).  Clinic phone number left for any further questions or concerns.

## 2018-06-07 ENCOUNTER — ANESTHESIA EVENT (OUTPATIENT)
Dept: SURGERY | Facility: AMBULATORY SURGERY CENTER | Age: 73
End: 2018-06-07

## 2018-06-08 ENCOUNTER — SURGERY (OUTPATIENT)
Age: 73
End: 2018-06-08

## 2018-06-08 ENCOUNTER — ANESTHESIA (OUTPATIENT)
Dept: SURGERY | Facility: AMBULATORY SURGERY CENTER | Age: 73
End: 2018-06-08

## 2018-06-08 ENCOUNTER — HOSPITAL ENCOUNTER (OUTPATIENT)
Facility: AMBULATORY SURGERY CENTER | Age: 73
End: 2018-06-08
Attending: ORTHOPAEDIC SURGERY
Payer: COMMERCIAL

## 2018-06-08 VITALS
WEIGHT: 155.4 LBS | TEMPERATURE: 98 F | OXYGEN SATURATION: 100 % | SYSTOLIC BLOOD PRESSURE: 138 MMHG | HEIGHT: 65 IN | BODY MASS INDEX: 25.89 KG/M2 | DIASTOLIC BLOOD PRESSURE: 80 MMHG | HEART RATE: 51 BPM | RESPIRATION RATE: 16 BRPM

## 2018-06-08 DIAGNOSIS — Z98.890 STATUS POST KNEE SURGERY: Primary | ICD-10-CM

## 2018-06-08 RX ORDER — ONDANSETRON 2 MG/ML
4 INJECTION INTRAMUSCULAR; INTRAVENOUS EVERY 30 MIN PRN
Status: DISCONTINUED | OUTPATIENT
Start: 2018-06-08 | End: 2018-06-09 | Stop reason: HOSPADM

## 2018-06-08 RX ORDER — DEXAMETHASONE SODIUM PHOSPHATE 4 MG/ML
INJECTION, SOLUTION INTRA-ARTICULAR; INTRALESIONAL; INTRAMUSCULAR; INTRAVENOUS; SOFT TISSUE PRN
Status: DISCONTINUED | OUTPATIENT
Start: 2018-06-08 | End: 2018-06-08

## 2018-06-08 RX ORDER — ONDANSETRON 4 MG/1
4 TABLET, ORALLY DISINTEGRATING ORAL EVERY 30 MIN PRN
Status: DISCONTINUED | OUTPATIENT
Start: 2018-06-08 | End: 2018-06-09 | Stop reason: HOSPADM

## 2018-06-08 RX ORDER — ACETAMINOPHEN 325 MG/1
325 TABLET ORAL EVERY 4 HOURS
Qty: 40 TABLET | Refills: 0 | Status: SHIPPED | OUTPATIENT
Start: 2018-06-08

## 2018-06-08 RX ORDER — SODIUM CHLORIDE, SODIUM LACTATE, POTASSIUM CHLORIDE, CALCIUM CHLORIDE 600; 310; 30; 20 MG/100ML; MG/100ML; MG/100ML; MG/100ML
INJECTION, SOLUTION INTRAVENOUS CONTINUOUS
Status: DISCONTINUED | OUTPATIENT
Start: 2018-06-08 | End: 2018-06-08 | Stop reason: HOSPADM

## 2018-06-08 RX ORDER — OXYCODONE HYDROCHLORIDE 5 MG/1
5 TABLET ORAL EVERY 4 HOURS PRN
Status: DISCONTINUED | OUTPATIENT
Start: 2018-06-08 | End: 2018-06-09 | Stop reason: HOSPADM

## 2018-06-08 RX ORDER — LIDOCAINE 40 MG/G
CREAM TOPICAL
Status: DISCONTINUED | OUTPATIENT
Start: 2018-06-08 | End: 2018-06-08 | Stop reason: HOSPADM

## 2018-06-08 RX ORDER — SODIUM CHLORIDE, SODIUM LACTATE, POTASSIUM CHLORIDE, CALCIUM CHLORIDE 600; 310; 30; 20 MG/100ML; MG/100ML; MG/100ML; MG/100ML
INJECTION, SOLUTION INTRAVENOUS CONTINUOUS
Status: DISCONTINUED | OUTPATIENT
Start: 2018-06-08 | End: 2018-06-09 | Stop reason: HOSPADM

## 2018-06-08 RX ORDER — HYDROCODONE BITARTRATE AND ACETAMINOPHEN 5; 325 MG/1; MG/1
1 TABLET ORAL EVERY 4 HOURS PRN
Qty: 12 TABLET | Refills: 0 | Status: SHIPPED | OUTPATIENT
Start: 2018-06-08 | End: 2018-06-21

## 2018-06-08 RX ORDER — GLYCOPYRROLATE 0.2 MG/ML
INJECTION, SOLUTION INTRAMUSCULAR; INTRAVENOUS PRN
Status: DISCONTINUED | OUTPATIENT
Start: 2018-06-08 | End: 2018-06-08

## 2018-06-08 RX ORDER — FENTANYL CITRATE 50 UG/ML
INJECTION, SOLUTION INTRAMUSCULAR; INTRAVENOUS PRN
Status: DISCONTINUED | OUTPATIENT
Start: 2018-06-08 | End: 2018-06-08

## 2018-06-08 RX ORDER — EPHEDRINE SULFATE 50 MG/ML
INJECTION, SOLUTION INTRAMUSCULAR; INTRAVENOUS; SUBCUTANEOUS PRN
Status: DISCONTINUED | OUTPATIENT
Start: 2018-06-08 | End: 2018-06-08

## 2018-06-08 RX ORDER — ACETAMINOPHEN 325 MG/1
975 TABLET ORAL ONCE
Status: COMPLETED | OUTPATIENT
Start: 2018-06-08 | End: 2018-06-08

## 2018-06-08 RX ORDER — PROPOFOL 10 MG/ML
INJECTION, EMULSION INTRAVENOUS CONTINUOUS PRN
Status: DISCONTINUED | OUTPATIENT
Start: 2018-06-08 | End: 2018-06-08

## 2018-06-08 RX ORDER — FENTANYL CITRATE 50 UG/ML
25-50 INJECTION, SOLUTION INTRAMUSCULAR; INTRAVENOUS
Status: DISCONTINUED | OUTPATIENT
Start: 2018-06-08 | End: 2018-06-08 | Stop reason: HOSPADM

## 2018-06-08 RX ORDER — OXYCODONE HYDROCHLORIDE 5 MG/1
5 TABLET ORAL ONCE
Status: COMPLETED | OUTPATIENT
Start: 2018-06-08 | End: 2018-06-08

## 2018-06-08 RX ORDER — ONDANSETRON 2 MG/ML
INJECTION INTRAMUSCULAR; INTRAVENOUS PRN
Status: DISCONTINUED | OUTPATIENT
Start: 2018-06-08 | End: 2018-06-08

## 2018-06-08 RX ORDER — FLUMAZENIL 0.1 MG/ML
0.2 INJECTION, SOLUTION INTRAVENOUS
Status: DISCONTINUED | OUTPATIENT
Start: 2018-06-08 | End: 2018-06-08 | Stop reason: HOSPADM

## 2018-06-08 RX ORDER — NALOXONE HYDROCHLORIDE 0.4 MG/ML
.1-.4 INJECTION, SOLUTION INTRAMUSCULAR; INTRAVENOUS; SUBCUTANEOUS
Status: DISCONTINUED | OUTPATIENT
Start: 2018-06-08 | End: 2018-06-08 | Stop reason: HOSPADM

## 2018-06-08 RX ORDER — MEPERIDINE HYDROCHLORIDE 25 MG/ML
12.5 INJECTION INTRAMUSCULAR; INTRAVENOUS; SUBCUTANEOUS
Status: DISCONTINUED | OUTPATIENT
Start: 2018-06-08 | End: 2018-06-09 | Stop reason: HOSPADM

## 2018-06-08 RX ORDER — NALOXONE HYDROCHLORIDE 0.4 MG/ML
.1-.4 INJECTION, SOLUTION INTRAMUSCULAR; INTRAVENOUS; SUBCUTANEOUS
Status: DISCONTINUED | OUTPATIENT
Start: 2018-06-08 | End: 2018-06-09 | Stop reason: HOSPADM

## 2018-06-08 RX ORDER — PROPOFOL 10 MG/ML
INJECTION, EMULSION INTRAVENOUS PRN
Status: DISCONTINUED | OUTPATIENT
Start: 2018-06-08 | End: 2018-06-08

## 2018-06-08 RX ORDER — LIDOCAINE HYDROCHLORIDE 20 MG/ML
INJECTION, SOLUTION INFILTRATION; PERINEURAL PRN
Status: DISCONTINUED | OUTPATIENT
Start: 2018-06-08 | End: 2018-06-08

## 2018-06-08 RX ADMIN — FENTANYL CITRATE 25 MCG: 50 INJECTION, SOLUTION INTRAMUSCULAR; INTRAVENOUS at 13:16

## 2018-06-08 RX ADMIN — PROPOFOL 100 MG: 10 INJECTION, EMULSION INTRAVENOUS at 12:23

## 2018-06-08 RX ADMIN — Medication 0.25 MG: at 12:56

## 2018-06-08 RX ADMIN — EPHEDRINE SULFATE 5 MG: 50 INJECTION, SOLUTION INTRAMUSCULAR; INTRAVENOUS; SUBCUTANEOUS at 12:33

## 2018-06-08 RX ADMIN — FENTANYL CITRATE 50 MCG: 50 INJECTION, SOLUTION INTRAMUSCULAR; INTRAVENOUS at 12:21

## 2018-06-08 RX ADMIN — PROPOFOL 100 MCG/KG/MIN: 10 INJECTION, EMULSION INTRAVENOUS at 12:01

## 2018-06-08 RX ADMIN — FENTANYL CITRATE 50 MCG: 50 INJECTION, SOLUTION INTRAMUSCULAR; INTRAVENOUS at 12:23

## 2018-06-08 RX ADMIN — GLYCOPYRROLATE 0.2 MG: 0.2 INJECTION, SOLUTION INTRAMUSCULAR; INTRAVENOUS at 12:08

## 2018-06-08 RX ADMIN — PROPOFOL 160 MG: 10 INJECTION, EMULSION INTRAVENOUS at 12:04

## 2018-06-08 RX ADMIN — OXYCODONE HYDROCHLORIDE 5 MG: 5 TABLET ORAL at 14:08

## 2018-06-08 RX ADMIN — FENTANYL CITRATE 25 MCG: 50 INJECTION, SOLUTION INTRAMUSCULAR; INTRAVENOUS at 13:09

## 2018-06-08 RX ADMIN — FENTANYL CITRATE 25 MCG: 50 INJECTION, SOLUTION INTRAMUSCULAR; INTRAVENOUS at 13:32

## 2018-06-08 RX ADMIN — ONDANSETRON 4 MG: 2 INJECTION INTRAMUSCULAR; INTRAVENOUS at 12:04

## 2018-06-08 RX ADMIN — LIDOCAINE HYDROCHLORIDE 100 MG: 20 INJECTION, SOLUTION INFILTRATION; PERINEURAL at 12:01

## 2018-06-08 RX ADMIN — DEXAMETHASONE SODIUM PHOSPHATE 4 MG: 4 INJECTION, SOLUTION INTRA-ARTICULAR; INTRALESIONAL; INTRAMUSCULAR; INTRAVENOUS; SOFT TISSUE at 12:04

## 2018-06-08 RX ADMIN — Medication 0.25 MG: at 12:44

## 2018-06-08 RX ADMIN — SODIUM CHLORIDE, SODIUM LACTATE, POTASSIUM CHLORIDE, CALCIUM CHLORIDE: 600; 310; 30; 20 INJECTION, SOLUTION INTRAVENOUS at 11:54

## 2018-06-08 RX ADMIN — ACETAMINOPHEN 975 MG: 325 TABLET ORAL at 10:18

## 2018-06-08 ASSESSMENT — LIFESTYLE VARIABLES: TOBACCO_USE: 1

## 2018-06-08 NOTE — ANESTHESIA CARE TRANSFER NOTE
Patient: Carmita Gautam    Procedure(s):  Left Knee Synovial Biopsy, Subtotal Synovectomy (Arthroscopic) - Wound Class: I-Clean   - Wound Class: I-Clean    Diagnosis: Chronic Synovitis, Consider Pigmented Villonodular Synovitis  Diagnosis Additional Information: No value filed.    Anesthesia Type:   General     Note:  Airway :Room Air  Patient transferred to:PACU  Comments: Patient awake and breathing spont. VSS. No complaints of pain or nausea. Report to RNHandoff Report: Identifed the Patient, Identified the Reponsible Provider, Reviewed the pertinent medical history, Discussed the surgical course, Reviewed Intra-OP anesthesia mangement and issues during anesthesia, Set expectations for post-procedure period and Allowed opportunity for questions and acknowledgement of understanding      Vitals: (Last set prior to Anesthesia Care Transfer)    CRNA VITALS  6/8/2018 1227 - 6/8/2018 1303      6/8/2018             Pulse: 85    SpO2: 99 %    Resp Rate (observed): (!)  7                Electronically Signed By: JASON Knutson CRNA  June 8, 2018  1:03 PM

## 2018-06-08 NOTE — ANESTHESIA POSTPROCEDURE EVALUATION
Patient: Carmita Gautam    Procedure(s):  Left Knee Synovial Biopsy, Subtotal Synovectomy (Arthroscopic) - Wound Class: I-Clean   - Wound Class: I-Clean    Diagnosis:Chronic Synovitis, Consider Pigmented Villonodular Synovitis  Diagnosis Additional Information: No value filed.    Anesthesia Type:  General    Note:  Anesthesia Post Evaluation    Patient location during evaluation: PACU  Patient participation: Able to fully participate in evaluation  Level of consciousness: awake and alert  Pain management: adequate  Airway patency: patent  Cardiovascular status: hemodynamically stable  Respiratory status: acceptable  Hydration status: stable  PONV: none     Anesthetic complications: None          Last vitals:  Vitals:    06/08/18 1330 06/08/18 1345 06/08/18 1356   BP: 136/78 151/67 144/74   Pulse:      Resp: 18 20    Temp: 36.4  C (97.6  F)  36.4  C (97.6  F)   SpO2: 100% 100%          Electronically Signed By: Eran Omalley MD  June 8, 2018  2:04 PM

## 2018-06-08 NOTE — ANESTHESIA PREPROCEDURE EVALUATION
Anesthesia Evaluation     . Pt has had prior anesthetic.            ROS/MED HX    ENT/Pulmonary:     (+)tobacco use, Past use 38 packs/day  , . .    Neurologic:  - neg neurologic ROS     Cardiovascular:  - neg cardiovascular ROS   (+) ----. : . . . :. . Previous cardiac testing Echodate:12/2014results:Stress Testdate:12/2014 results:ECG reviewed date: results: date: results:          METS/Exercise Tolerance:     Hematologic:         Musculoskeletal:  - neg musculoskeletal ROS       GI/Hepatic:     (+) GERD Asymptomatic on medication,       Renal/Genitourinary:         Endo:     (+) thyroid problem .      Psychiatric:  - neg psychiatric ROS       Infectious Disease:  - neg infectious disease ROS       Malignancy:   (+) Malignancy History of Other  Other CA Thyroid Remission status post Surgery         Other:                     Physical Exam  Normal systems: cardiovascular, pulmonary and dental    Airway   Mallampati: I  TM distance: >3 FB  Neck ROM: full    Dental     Cardiovascular   Rhythm and rate: regular and normal      Pulmonary    breath sounds clear to auscultation                    Anesthesia Plan      History & Physical Review  History and physical reviewed and following examination; no interval change.    ASA Status:  2 .    NPO Status:  > 8 hours    Plan for General and LMA with Intravenous and Propofol induction. Maintenance will be Balanced.    PONV prophylaxis:  Ondansetron (or other 5HT-3) and Dexamethasone or Solumedrol       Postoperative Care  Postoperative pain management:  Multi-modal analgesia.      Consents  Anesthetic plan, risks, benefits and alternatives discussed with:  Patient..                          .

## 2018-06-08 NOTE — IP AVS SNAPSHOT
MRN:[id number]                      After Visit Summary   6/8/2018    Carmita Gautam    MRN: [id number]           Thank you!     Thank you for choosing [facility] for your care. Our goal is always to provide you with excellent care. Hearing back from our patients is one way we can continue to improve our services. Please take a few minutes to complete the written survey that you may receive in the mail after you visit with us. Thank you!        Patient Information     Date Of Birth          1945        About your hospital stay     You were admitted on:  June 8, 2018 [facility]    You were discharged on:  June 8, 2018       Who to Call     For medical emergencies, please call 911.  For non-urgent questions about your medical care, please call your primary care provider or clinic, [phone]  For questions related to your surgery, please call your surgery clinic        Attending Provider     Provider Vaishali Michelle MD Orthopedics       Primary Care Provider Office Phone # Fax #    Magaly Hillman [phone]      After Care Instructions     Discharge Instructions       KNEE ARTHROSCOPY POST OPERATIVE DISCHARGE INSTRUCTIONS    FOLLOW UP APPOINTMENT  You are scheduled for a post operative wound check with Dr. Thomas's nurse approximately two weeks after surgery. At approximately six weeks after surgery, you will see Dr. Thomas in clinic.     Your follow up appointments will be at the location that you regularly see Dr. Thomas:    [facility]  [address] [place] [address]  [phone]    [facility]  [address] [place] [address] [phone]    Physical therapy:   A prescription for physical therapy will be sent home with you. You will also receive a physical therapy protocol in your after visit summary. These  documents must be taken to your first therapy visit.      ACTIVITY  Weight bearing status:   You may bear weight on your operative extremity as tolerated using assistive devices (crutches) as needed. Eventually, you should wean from all assistive devices. Although we would like you to discontinue use of assistive devices as soon as possible, do not transition until you have worked with your physical therapist to achieve safe balance and comfort.     Hinged knee brace:  None    Exercises:   Perform the following exercises at least three times per day for the first four weeks after surgery to prevent complications, such as blood clots in your legs:  1) Point and flex your feet  2) Move your ankle around in big circles  3) Wiggle your toes   Also, perform thigh muscle tightening exercises for 10 to 15 minutes at least three times per day for the first four weeks after surgery.    Athletic Activities:  Activities such as swimming, bicycling, jogging, running, and stop-and-go sports should be avoided until permitted by your provider.    Driving:  Driving is not permitted until directed by your provider. Typically, driving is restricted for three to four weeks after right knee surgery and three weeks after left knee surgery. Under no circumstance are you permitted to drive while using narcotic pain medications.    Return to Work:  You may return to work when directed by your provider. Typically, patients with desk/sitting jobs can return to work within two weeks while patients with heavy labor jobs can return to work around three months after surgery.      COMFORT AND PAIN MANAGEMENT  Elevation:   During times of inactivity throughout the first two weeks after surgery, make an effort to decrease swelling by elevating your operative extremity. This is most effectively done by lying down and placing several pillows lengthwise under your thigh and calf to raise your toes above the level of your nose. To ensure that your knee  remains in full extension, do not place pillows directly under your knee.     Icing:  An ice pack will be provided to control swelling and discomfort after surgery. Place a thin towel on your skin and apply the ice pack overtop. You may apply ice for 20 minutes as often as two times per hour.    Pain Medications:  You will be discharged with acetaminophen (Tylenol) and a narcotic medication for pain management after surgery. Acetaminophen can help to effectively manage pain when used as prescribed, however, do not exceed the maximum daily dose of 3000 mg. The narcotic pain medication should be reserved for severe, breakthrough pain. Take the narcotic medication as prescribed and use only as often as necessary.       ANTICOAGULATION  Continue home aspirin 325mg daily for the first four weeks after surgery.      WOUND CARE AND SHOWERING  Wound care:  Keep the initial post-op dressing on, clean, and dry for the first 24 hours after surgery. You may then remove the dressing and replace it with a fresh ABD and tubigrip/ACE wrap per the instructions of your discharge nurse. Your surgical incision was closed with Dermabond (a surgical adhesive that is directly on the incision areas). The Dermabond should be left on until it falls off or is removed at your first office visit. Under no circumstance should you pick or scratch your incision.    Showering:  You may shower three days after surgery provided your incision is intact and dry without drainage. If there is fluid at the incision site, cover the incision with a non-permeable plastic bag. You may allow water to run over the incision, but do not soak or submerge the incision. Do not scrub the incision.     Tub Bathing:  Tub bathing, swimming, or any other activities that cause your incision to be submerged should be avoided until allowed by your provider. Typically, patients are allowed to return to these activities four weeks after surgery.      CONTACTING YOUR  PHYSICIAN:  You may experience symptoms that require follow-up before your scheduled two week appointment. Please contact Dr. Thomas's office if you experience:  1) Pain in your knee that persists or worsens in the first few days after surgery  2) Excessive redness or drainage of cloudy or bloody material from the wounds (clear red tinted fluid and some mild drainage should be expected) or drainage of any kind five days after surgery  3) A temperature elevation greater than 101.5 F   4) Pain, swelling or redness in your calf  5) Numbness or weakness in your leg or foot      Regular business hours (Monday - Friday, 8am - 5pm):  [facility]: [phone]  [facility]: [phone]    After hours and weekends:  [facility] on call Orthopedic resident: [phone]            Ice to affected area       Ice pack to surgical site every 15 minutes per hour for 24 hours            No Alcohol       While on narcotic medication            No driving or operating machinery       Do not drive or operate machinery until narcotic pain medications are discontinued            Weight bearing - As tolerated       Weight bearing as tolerated with crutches until normal gait is restored                  Your next 10 appointments already scheduled     Jun 21, 2018 10:00 AM CDT   (Arrive by 9:45 AM)   Post-Op with Alma Rosa U Ortho Nurse   [facility]    [address] [place] [id number] [phone]            Jul 17, 2018 11:30 AM CDT   (Arrive by 11:15 AM)   RETURN KNEE with Vaishali Thomas MD   [facility]    [address] [place] [id number] [phone]              Additional Services     PHYSICAL THERAPY REFERRAL (External-Prints)       Physical Therapy Referral                  Further instructions from your  care team       [facility]  Home Care Following Anesthesia  For 24 hours after surgery:  1. Get plenty of rest.  A responsible adult must stay with you for at least 24 hours after you leave the surgery center.  2. Do not drive or use heavy equipment.  If you have weakness or tingling, don't drive or use heavy equipment until this feeling goes away.   3. Do not drink alcohol.   4. Avoid strenuous or risky activities.  Ask for help when climbing stairs.  5. You may feel lightheaded.  IF so, sit for a few minutes before standing.  Have someone help you get up.   6. If you have nausea (feel sick to your stomach): Drink only clear liquids such as apple juice, ginger ale, broth or 7-Up.  Rest may also help.  Be sure to drink enough fluids.  Move to a regular diet as you feel able.   7. You may have a slight fever.  Call the doctor if your fever is over 100 F (37.7 C) (taken under the tongue) or lasts longer than 24 hours.  8. You may have a dry mouth, a sore throat, muscle aches or trouble sleeping. These should go away after 24 hours.  9. Do not make important or legal decisions.               Tips for taking pain medications  To get the best pain relief possible, remember these points:    Take pain medications as directed, before pain becomes severe.    Pain medication can upset your stomach: taking it with food may help.    Constipation is a common side effect of pain medication. Drink plenty of  fluids.    Eat foods high in fiber. Take a stool softener if recommended by your doctor or pharmacist.    Do not drink alcohol, drive or operate machinery while taking pain medications.    Ask about other ways to control pain, such as with heat, ice or relaxation.    Tylenol/Acetaminophen Consumption  To help encourage the safe use of acetaminophen, the makers of TYLENOL  have lowered the maximum daily dose for single-ingredient Extra Strength TYLENOL  (acetaminophen) products sold in the  [place] from 8 pills per day (4,000 mg) to 6 pills per day (3,000 mg). The dosing interval has also changed from 2 pills every 4-6 hours to 2 pills every 6 hours.    If you feel your pain relief is insufficient, you may take Tylenol/Acetaminophen in addition to your narcotic pain medication.     Be careful not to exceed 3,000 mg of Tylenol/Acetaminophen in a 24 hour period from all sources.    If you are taking extra strength Tylenol/acetaminophen (500 mg), the maximum dose is 6 tablets in 24 hours.    If you are taking regular strength acetaminophen (325 mg), the maximum dose is 9 tablets in 24 hours.    Call a doctor for any of the followin. Signs of infection (fever, growing tenderness at the surgery site, a large amount of drainage or bleeding, severe pain, foul-smelling drainage, redness, swelling).  2. It has been over 8 to 10 hours since surgery and you are still not able to urinate (pass water).  3. Headache for over 24 hours.  4. Numbness, tingling or weakness the day after surgery (if you had spinal anesthesia).  Your doctor is:  Dr. Vaishali Thomas, Orthopaedics: [phone]                    Or dial [phone] and ask for the resident on call for:  Orthopaedics  For emergency care, call the:  [facility] Emergency Department: [phone] (TTY for hearing impaired: [phone])    Safety Tips for Using Crutches    Crutch Fit:    Assume good standing posture with shoulders relaxed and crutch tips 6-8 inches out from the side of the foot.    The underarm pad should fall 2-3 fingers width below the armpit.    The handgrip is positioned level with the wrist to allow 30  flexion at the elbow.    Safety Tips:    Bear weight on your hands, not on your armpits.    Do not add extra padding to the underarm pad. This will, in effect, lengthen the crutches and increase risk of nerve injury.    Wear flat, properly fitting shoes. Do not walk in stocking feet, high heels or slippers.    Household  "hazards:  --Throw rugs should be removed from floors.  --Stairs should be cleared of obstacles.  --Use extra caution on slippery, highly polished, littered or uneven floor surfaces.  --Check for electric cords.    Check crutch tips for excessive wear and keep wing nuts tight.    While walking, look forward with  head up  and  eyes open.  Take equal length steps.    Use BOTH crutches.    Stairs Sequence:    UP: \"Good\" leg first, followed by  bad  leg, then crutches.    DOWN: Crutches, followed by  bad  leg, \"good\" leg.     Walking with Crutches:    Move both crutches forward at the same time.    Non-Weight Bearing (NWB):  Hold the involved leg up and swing through the crutches with the involved leg. The involved leg does not touch the floor.    Toe Touch Weight Bearing (TTWB): Move the involved leg forward. Rest it lightly on the floor for balance only. Step through the crutches with the uninvolved leg.    Partial Weight Bearing (PWB): Move the involved leg forward. Step down the weight of the leg only.  Step through the crutches with the uninvolved leg.    Weight Bearing As Tolerated (WBAT): Move the involved leg forward. Put as much pressure through the involved leg as you can tolerate comfortably. Then step through the crutches with the uninvolved leg.                Pending Results     Date and Time Order Name Status Description    6/8/2018 1231 Surgical pathology exam In process             Admission Information     Date & Time Provider Department Dept. Phone    6/8/2018 Vaishali Thomas MD [facility] [phone]      Your Vitals Were     Blood Pressure Pulse Temperature Respirations Height Weight    120/68 51 97.5  F (36.4  C) (Temporal) 14 1.651 m (5' 5\") 70.5 kg (155 lb 6.4 oz)    Pulse Oximetry BMI (Body Mass Index)                94% 25.86 kg/m2          MyChart Information     [employer] is an electronic gateway that provides easy, online access to your medical records. With " Allanit, you can request a clinic appointment, read your test results, renew a prescription or communicate with your care team.     To sign up for [employer] visit the website at [email or web address]   You will be asked to enter the access code listed below, as well as some personal information. Please follow the directions to create your username and password.     Your access code is: [email or web address]  Expires: 2018  6:30 AM     Your access code will  in 90 days. If you need help or a new code, please contact your [facility] or call [phone] for assistance.        Care EveryWhere ID     This is your Care EveryWhere ID. This could be used by other organizations to access your [place] medical records  [id number]        Equal Access to Services     CHARLOTTE GRANDE : Esperanza Toledo, anshu chambers, chandu garsia, sergey cat . So [facility] [phone].    ATENCIÓN: Si habla español, tiene a farmer disposición servicios gratuitos de asistencia lingüística. Gali al [phone].    We comply with applicable federal civil rights laws and [place] laws. We do not discriminate on the basis of race, color, national origin, age, disability, sex, sexual orientation, or gender identity.               Review of your medicines      UNREVIEWED medicines. Ask your doctor about these medicines        Dose / Directions    SIMVASTATIN PO        Dose:  40 mg   Take 40 mg by mouth At Bedtime   Refills:  0         START taking        Dose / Directions    HYDROcodone-acetaminophen 5-325 MG per tablet   Commonly known as:  NORCO   Used for:  Status post knee surgery        Dose:  1 tablet   Take 1 tablet by mouth every 4 hours as needed for other (Moderate to Severe Pain)   Quantity:  12 tablet   Refills:  0         CONTINUE these medicines which may have CHANGED, or have new prescriptions. If we are uncertain of the size of tablets/capsules  you have at home, strength may be listed as something that might have changed.        Dose / Directions    acetaminophen 325 MG tablet   Commonly known as:  TYLENOL   This may have changed:    - medication strength  - how much to take  - when to take this   Used for:  Status post knee surgery        Dose:  325 mg   Take 1 tablet (325 mg) by mouth every 4 hours   Quantity:  40 tablet   Refills:  0         CONTINUE these medicines which have NOT CHANGED        Dose / Directions    ASPIRIN PO        Dose:  325 mg   Take 325 mg by mouth daily   Refills:  0       ATIVAN 1 MG tablet   Generic drug:  LORazepam        Dose:  1 mg   Take 1 mg by mouth every 6 hours as needed for anxiety   Refills:  0       bisacodyl 5 MG EC tablet   Commonly known as:  DULCOLAX        Dose:  5 mg   Take 5 mg by mouth daily as needed for constipation   Refills:  0       CALCIUM 600+D 600-200 MG-UNIT Tabs   Generic drug:  calcium carbonate-vitamin D        Refills:  0       CoQ10 100 MG Caps   Used for:  S/P total knee arthroplasty, left        Refills:  0       docusate sodium 100 MG capsule   Commonly known as:  COLACE        Dose:  100 mg   Take 100 mg by mouth 2 times daily as needed for constipation   Refills:  0       FIBER PO   Used for:  S/P total knee arthroplasty, left        Dose:  1 tablet   Take 1 tablet by mouth daily   Refills:  0       fish Oil 1200 MG capsule   Used for:  S/P total knee arthroplasty, left        Dose:  1200 mg   Take 1,200 mg by mouth daily   Refills:  0       GAVISCON EXTRA STRENGTH 160-105 MG Chew   Generic drug:  Alum hydroxide-Mag carbonate        Take by mouth At Bedtime   Refills:  0       magnesium 250 MG tablet        Dose:  1 tablet   Take 1 tablet by mouth daily   Refills:  0       PANTOPRAZOLE SODIUM PO   Used for:  S/P total knee arthroplasty, left        Dose:  40 mg   Take 40 mg by mouth 2 times daily   Refills:  0       PAXIL PO        Dose:  20 mg   Take 20 mg by mouth daily   Refills:  0        rosuvastatin 10 MG tablet   Commonly known as:  CRESTOR   Used for:  S/P total knee arthroplasty, left        Dose:  10 mg   Take 10 mg by mouth daily   Refills:  0       SYNTHROID PO        Dose:  200 mcg   Take 200 mcg by mouth daily   Refills:  0       traZODone 50 MG tablet   Commonly known as:  DESYREL   Used for:  S/P total knee arthroplasty, left        Dose:  50 mg   Take 50 mg by mouth At Bedtime   Refills:  0       VITAMIN D (CHOLECALCIFEROL) PO   Used for:  S/P total knee arthroplasty, left        Dose:  1000 Units   Take 1,000 Units by mouth daily   Refills:  0         STOP taking     hydrOXYzine 10 MG tablet   Commonly known as:  ATARAX           omeprazole 40 MG capsule   Commonly known as:  priLOSEC           OMEPRAZOLE PO           oxyCODONE IR 10 MG tablet   Commonly known as:  ROXICODONE           senna-docusate 8.6-50 MG per tablet   Commonly known as:  SENOKOT-S;PERICOLACE           warfarin 4 MG tablet   Commonly known as:  COUMADIN                Where to get your medicines      These medications were sent to [address] [address]    Hours:  TRANSPLANT PHONE NUMBER [phone] Phone:  [phone]     acetaminophen 325 MG tablet         Some of these will need a paper prescription and others can be bought over the counter. Ask your nurse if you have questions.     Bring a paper prescription for each of these medications     HYDROcodone-acetaminophen 5-325 MG per tablet                Protect others around you: Learn how to safely use, store and throw away your medicines at www.disposemymeds.org.        Information about OPIOIDS     PRESCRIPTION OPIOIDS: WHAT YOU NEED TO KNOW   You have a prescription for an opioid (narcotic) pain medicine. Opioids can cause addiction. If you have a history of chemical dependency of any type, you are at a higher risk of becoming addicted to opioids.  Only take this medicine after all other options have been tried. Take it for as short a time and as few doses as possible.     Do not:    Drive. If you drive while taking these medicines, you could be arrested for driving under the influence (DUI).    Operate heavy machinery    Do any other dangerous activities while taking these medicines.     Drink any alcohol while taking these medicines.      Take with any other medicines that contain acetaminophen. Read all labels carefully. Look for the word  acetaminophen  or  Tylenol.  Ask your pharmacist if you have questions or are unsure.    Store your pills in a secure place, locked if possible. We will not replace any lost or stolen medicine. If you don t finish your medicine, please throw away (dispose) as directed by your pharmacist. The [place] Pollution Control Agency has more information about safe disposal: https://www.pca.[facility].[place].us/living-green/managing-unwanted-medications    All opioids tend to cause constipation. Drink plenty of water and eat foods that have a lot of fiber, such as fruits, vegetables, prune juice, apple juice and high-fiber cereal. Take a laxative (Miralax, milk of magnesia, Colace, Senna) if you don t move your bowels at least every other day.              Medication List: This is a list of all your medications and when to take them. Check marks below indicate your daily home schedule. Keep this list as a reference.      Medications           Morning Afternoon Evening Bedtime As Needed    acetaminophen 325 MG tablet   Commonly known as:  TYLENOL   Take 1 tablet (325 mg) by mouth every 4 hours   Last time this was given:  975 mg on 6/8/2018 10:18 AM                                ASPIRIN PO   Take 325 mg by mouth daily                                ATIVAN 1 MG tablet   Take 1 mg by mouth every 6 hours as needed for anxiety   Generic drug:  LORazepam                                bisacodyl 5 MG EC tablet   Commonly known as:  DULCOLAX    Take 5 mg by mouth daily as needed for constipation                                CALCIUM 600+D 600-200 MG-UNIT Tabs   Generic drug:  calcium carbonate-vitamin D                                CoQ10 100 MG Caps                                docusate sodium 100 MG capsule   Commonly known as:  COLACE   Take 100 mg by mouth 2 times daily as needed for constipation                                FIBER PO   Take 1 tablet by mouth daily                                fish Oil 1200 MG capsule   Take 1,200 mg by mouth daily                                GAVISCON EXTRA STRENGTH 160-105 MG Chew   Take by mouth At Bedtime   Generic drug:  Alum hydroxide-Mag carbonate                                HYDROcodone-acetaminophen 5-325 MG per tablet   Commonly known as:  NORCO   Take 1 tablet by mouth every 4 hours as needed for other (Moderate to Severe Pain)                                magnesium 250 MG tablet   Take 1 tablet by mouth daily                                PANTOPRAZOLE SODIUM PO   Take 40 mg by mouth 2 times daily                                PAXIL PO   Take 20 mg by mouth daily                                rosuvastatin 10 MG tablet   Commonly known as:  CRESTOR   Take 10 mg by mouth daily                                SIMVASTATIN PO   Take 40 mg by mouth At Bedtime                                SYNTHROID PO   Take 200 mcg by mouth daily                                traZODone 50 MG tablet   Commonly known as:  DESYREL   Take 50 mg by mouth At Bedtime                                VITAMIN D (CHOLECALCIFEROL) PO   Take 1,000 Units by mouth daily

## 2018-06-08 NOTE — IP AVS SNAPSHOT
[facility]    [address] [place] [id number]    Phone:  [phone]    Fax:  [phone]                                       After Visit Summary   6/8/2018    Carmita Gautam    MRN: [id number]           After Visit Summary Signature Page     I have received my discharge instructions, and my questions have been answered. I have discussed any challenges I see with this plan with the nurse or doctor.    ..........................................................................................................................................  Patient/Patient Representative Signature      ..........................................................................................................................................  Patient Representative Print Name and Relationship to Patient    ..................................................               ................................................  Date                                            Time    ..........................................................................................................................................  Reviewed by Signature/Title    ...................................................              ..............................................  Date                                                            Time

## 2018-06-08 NOTE — BRIEF OP NOTE
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 Thomas    Anesthesia: General     Estimated Blood Loss: 25 cc    Tourniquet Time: none    Specimen: none       Complications: none    Condition: stable    Findings: please see dictated operative note    Plan:    WBAT with assistive devices as needed    ROM as tolerated by patient, no restrictions    PT as outpatient; an order and PT protocol will be provided to the patient at time of discharge    ADAT    Pain control with orals prn    Bowel prophylaxis with senna-docusate    DVT prophylaxis: mechanical, resume home ASA     Future Appointments  Date Time Provider Department Center   2018 10:00 AM Nurse, [facility]   2018 11:30 AM Vaishali Thomas MD [facility]       Disposition: patient may be discharged with  once appropriate discharge criteria have been met      Coy Frazier PA-C  Orthopaedic Surgery    Please page me at [phone] with any questions/concerns. If there is no response, if it is a weekend, or if it is during evening hours, please page the orthopaedic surgery resident on call.

## 2018-06-08 NOTE — DISCHARGE INSTRUCTIONS
[facility]  Home Care Following Anesthesia  For 24 hours after surgery:  1. Get plenty of rest.  A responsible adult must stay with you for at least 24 hours after you leave the surgery center.  2. Do not drive or use heavy equipment.  If you have weakness or tingling, don't drive or use heavy equipment until this feeling goes away.   3. Do not drink alcohol.   4. Avoid strenuous or risky activities.  Ask for help when climbing stairs.  5. You may feel lightheaded.  IF so, sit for a few minutes before standing.  Have someone help you get up.   6. If you have nausea (feel sick to your stomach): Drink only clear liquids such as apple juice, ginger ale, broth or 7-Up.  Rest may also help.  Be sure to drink enough fluids.  Move to a regular diet as you feel able.   7. You may have a slight fever.  Call the doctor if your fever is over 100 F (37.7 C) (taken under the tongue) or lasts longer than 24 hours.  8. You may have a dry mouth, a sore throat, muscle aches or trouble sleeping. These should go away after 24 hours.  9. Do not make important or legal decisions.               Tips for taking pain medications  To get the best pain relief possible, remember these points:    Take pain medications as directed, before pain becomes severe.    Pain medication can upset your stomach: taking it with food may help.    Constipation is a common side effect of pain medication. Drink plenty of  fluids.    Eat foods high in fiber. Take a stool softener if recommended by your doctor or pharmacist.    Do not drink alcohol, drive or operate machinery while taking pain medications.    Ask about other ways to control pain, such as with heat, ice or relaxation.    Tylenol/Acetaminophen Consumption  To help encourage the safe use of acetaminophen, the makers of TYLENOL  have lowered the maximum daily dose for single-ingredient Extra Strength TYLENOL  (acetaminophen) products sold in the [place] from 8  pills per day (4,000 mg) to 6 pills per day (3,000 mg). The dosing interval has also changed from 2 pills every 4-6 hours to 2 pills every 6 hours.    If you feel your pain relief is insufficient, you may take Tylenol/Acetaminophen in addition to your narcotic pain medication.     Be careful not to exceed 3,000 mg of Tylenol/Acetaminophen in a 24 hour period from all sources.    If you are taking extra strength Tylenol/acetaminophen (500 mg), the maximum dose is 6 tablets in 24 hours.    If you are taking regular strength acetaminophen (325 mg), the maximum dose is 9 tablets in 24 hours.    Call a doctor for any of the followin. Signs of infection (fever, growing tenderness at the surgery site, a large amount of drainage or bleeding, severe pain, foul-smelling drainage, redness, swelling).  2. It has been over 8 to 10 hours since surgery and you are still not able to urinate (pass water).  3. Headache for over 24 hours.  4. Numbness, tingling or weakness the day after surgery (if you had spinal anesthesia).  Your doctor is:  Dr. Vaishali Thomas, Orthopaedics: [phone]                    Or dial [phone] and ask for the resident on call for:  Orthopaedics  For emergency care, call the:  [facility] Emergency Department: [phone] (TTY for hearing impaired: [phone])    Safety Tips for Using Crutches    Crutch Fit:    Assume good standing posture with shoulders relaxed and crutch tips 6-8 inches out from the side of the foot.    The underarm pad should fall 2-3 fingers width below the armpit.    The handgrip is positioned level with the wrist to allow 30  flexion at the elbow.    Safety Tips:    Bear weight on your hands, not on your armpits.    Do not add extra padding to the underarm pad. This will, in effect, lengthen the crutches and increase risk of nerve injury.    Wear flat, properly fitting shoes. Do not walk in stocking feet, high heels or slippers.    Household hazards:  --Throw rugs  "should be removed from floors.  --Stairs should be cleared of obstacles.  --Use extra caution on slippery, highly polished, littered or uneven floor surfaces.  --Check for electric cords.    Check crutch tips for excessive wear and keep wing nuts tight.    While walking, look forward with  head up  and  eyes open.  Take equal length steps.    Use BOTH crutches.    Stairs Sequence:    UP: \"Good\" leg first, followed by  bad  leg, then crutches.    DOWN: Crutches, followed by  bad  leg, \"good\" leg.     Walking with Crutches:    Move both crutches forward at the same time.    Non-Weight Bearing (NWB):  Hold the involved leg up and swing through the crutches with the involved leg. The involved leg does not touch the floor.    Toe Touch Weight Bearing (TTWB): Move the involved leg forward. Rest it lightly on the floor for balance only. Step through the crutches with the uninvolved leg.    Partial Weight Bearing (PWB): Move the involved leg forward. Step down the weight of the leg only.  Step through the crutches with the uninvolved leg.    Weight Bearing As Tolerated (WBAT): Move the involved leg forward. Put as much pressure through the involved leg as you can tolerate comfortably. Then step through the crutches with the uninvolved leg.              "

## 2018-06-09 NOTE — OP NOTE
Procedure Date: 06/08/2018      PREOPERATIVE DIAGNOSIS:   1.  Chronic swelling with recurrent bleeds, left knee.   2.  Status post previous total knee replacement.   3.  Pathologic diagnosis of pigmented villous-like synovial biopsy without fitting the criteria of PVNS on the opposite knee 2012.      POSTOPERATIVE DIAGNOSES:   1.  Serosanguinous fluid upon entry into the joint.   2.  Moderate amount of hemosiderin stained synovium with minimal villous formation.      OPERATIONS:   1.  Left knee arthroscopy.   2.  Multiple synovial biopsies.   3.  Subtotal synovectomy.      OPERATORS:  Vaishali Thomas MD and Ander Arriaza MD.      ANESTHESIA:  General.      FINDINGS:  Exam under anesthesia of patient's left knee revealed full range of motion with normal patellar tracking and stable tibiofemoral exam.      Arthroscopic findings revealed approximately 20 mL of serosanguineous fluid upon entry into the joint.      The patient's joint was most notable for a very reddish synovium with some hemosiderin staining around the joint.  There was some villous formation noted, but this was not extensive.  The synovitis was most notable in the superior patellar pouch close to the joint.      DESCRIPTION OF PROCEDURE:  Under general anesthesia, the patient was positioned supine on the operating table.  The patient's leg was prepped and draped in the usual sterile fashion.  A pause was performed identifying the correct leg and the administration of antibiotics.      We used the previous portals for anteromedial and anterolateral incisions and we used a superior medial for inflow.  Findings as above.      No tourniquet was used.      We began the procedure by taking approximately 5 biopsies.      Following this, we used a large bore cannulas released adhesion down both the medial and lateral gutters and across the suprapatellar pouch.      At the end of the procedure, excess fluid was removed from the knee.  Portals were closed  with simple sutures of nylon.  The patient tolerated the procedure well and was taken to recovery room in satisfactory condition.  She will be maintained weightbearing as tolerates.      The pathology was sent for rule out pigmented villonodular synovitis as well as foreign body response.  There did not appear to be any visible polyethylene wear products in the synovium.         IVANA BUCKNER MD             D: 2018   T: 2018   MT: judit      Name:     VINCE MOHAN   MRN:      [id number]        Account:        [id number]   :      1945           Procedure Date: 2018      Document: [id number]

## 2018-06-13 LAB — COPATH REPORT: NORMAL

## 2018-06-18 ENCOUNTER — TELEPHONE (OUTPATIENT)
Dept: ORTHOPEDICS | Facility: CLINIC | Age: 73
End: 2018-06-18

## 2018-06-18 NOTE — TELEPHONE ENCOUNTER
[facility] Call Center    Phone Message    May a detailed message be left on voicemail: yes    Reason for Call: Other: Surgery with Dr. Thomas 6.8.18.  Pt discharge instructions indicate that there should be protocols included from Dr. Thomas.  Please fax protocol to [phone] [employer]     Action Taken: Message routed to:  [facility] ([facility] orth[facility]

## 2018-06-19 NOTE — TELEPHONE ENCOUNTER
Returned call to [employer] in [place].  PT protocol faxed this morning for patient.  Clinic phone number left for them to call back if they have any further questions or concerns.

## 2018-06-21 ENCOUNTER — OFFICE VISIT (OUTPATIENT)
Dept: ORTHOPEDICS | Facility: CLINIC | Age: 73
End: 2018-06-21
Payer: COMMERCIAL

## 2018-06-21 ENCOUNTER — RADIANT APPOINTMENT (OUTPATIENT)
Dept: ULTRASOUND IMAGING | Facility: CLINIC | Age: 73
End: 2018-06-21
Attending: ORTHOPAEDIC SURGERY
Payer: COMMERCIAL

## 2018-06-21 DIAGNOSIS — Z98.890 S/P LEFT KNEE SURGERY: ICD-10-CM

## 2018-06-21 DIAGNOSIS — Z98.890 STATUS POST KNEE SURGERY: ICD-10-CM

## 2018-06-21 DIAGNOSIS — M79.605 PAIN OF LEFT LOWER EXTREMITY: Primary | ICD-10-CM

## 2018-06-21 DIAGNOSIS — M79.605 PAIN OF LEFT LOWER EXTREMITY: ICD-10-CM

## 2018-06-21 RX ORDER — HYDROCODONE BITARTRATE AND ACETAMINOPHEN 5; 325 MG/1; MG/1
1 TABLET ORAL EVERY 4 HOURS PRN
Qty: 15 TABLET | Refills: 0 | Status: SHIPPED | OUTPATIENT
Start: 2018-06-21 | End: 2018-08-07

## 2018-06-21 ASSESSMENT — ENCOUNTER SYMPTOMS
CHILLS: 0
FATIGUE: 0
ALTERED TEMPERATURE REGULATION: 0
DOUBLE VISION: 0
RECTAL PAIN: 0
MUSCLE CRAMPS: 1
EYE PAIN: 0
EYE WATERING: 0
WEIGHT LOSS: 0
ARTHRALGIAS: 1
BLOATING: 1
EYE REDNESS: 1
ABDOMINAL PAIN: 0
NIGHT SWEATS: 0
DECREASED APPETITE: 0
CONSTIPATION: 0
VOMITING: 0
MYALGIAS: 0
NECK PAIN: 0
DIARRHEA: 0
HALLUCINATIONS: 0
STIFFNESS: 0
FEVER: 0
POLYDIPSIA: 0
MUSCLE WEAKNESS: 0
HEARTBURN: 1
BLOOD IN STOOL: 0
EYE IRRITATION: 0
JAUNDICE: 0
JOINT SWELLING: 1
BACK PAIN: 0
NAUSEA: 0
BOWEL INCONTINENCE: 0
WEIGHT GAIN: 0
INCREASED ENERGY: 0

## 2018-06-21 NOTE — MR AVS SNAPSHOT
After Visit Summary   2018    Carmita aGutam    MRN: [id number]           Patient Information     Date Of Birth          1945        Visit Information        Provider Department      2018 10:00 AM Nurse, [facility]        Today's Diagnoses     Pain of left lower extremity    -  1    Status post knee surgery        S/P left knee surgery           Follow-ups after your visit        Your next 10 appointments already scheduled     2018 11:30 AM CDT   (Arrive by 11:15 AM)   RETURN KNEE with Vaishali Thomas MD   [facility] ([facility])    [address] [place] [id number] [phone]              Future tests that were ordered for you today     Open Future Orders        Priority Expected Expires Ordered    US Extremity non-vascular LT Routine  2019            Who to contact     Please call your clinic at [phone] to:    Ask questions about your health    Make or cancel appointments    Discuss your medicines    Learn about your test results    Speak to your doctor            Additional Information About Your Visit        MyChart Information     [facility] [employer] is an electronic gateway that provides easy, online access to your medical records. With [facility] [employer], you can request a clinic appointment, read your test results, renew a prescription or communicate with your care team.     To sign up for [facility] [employer] visit the website at [email or web address]   You will be asked to enter the access code listed below, as well as some personal information. Please follow the directions to create your username and password.     Your access code is: [email or web address]  Expires: 2018  6:30 AM     Your access code will  in 90 days. If you need help or a new code, please contact your [facility] or call [phone] for assistance.        Care EveryWhere ID      This is your Care EveryWhere ID. This could be used by other organizations to access your [place] medical records  [id number]         Blood Pressure from Last 3 Encounters:   06/08/18 138/80   09/07/15 110/49    Weight from Last 3 Encounters:   06/08/18 70.5 kg (155 lb 6.4 oz)   05/01/18 70.8 kg (156 lb)   09/03/15 73.1 kg (161 lb 3.2 oz)                 Where to get your medicines      Some of these will need a paper prescription and others can be bought over the counter.  Ask your nurse if you have questions.     Bring a paper prescription for each of these medications     HYDROcodone-acetaminophen 5-325 MG per tablet         Information about OPIOIDS     PRESCRIPTION OPIOIDS: WHAT YOU NEED TO KNOW   We gave you an opioid (narcotic) pain medicine. It is important to manage your pain, but opioids are not always the best choice. You should first try all the other options your care team gave you. Take this medicine for as short a time (and as few doses) as possible.     These medicines have risks:    DO NOT drive when on new or higher doses of pain medicine. These medicines can affect your alertness and reaction times, and you could be arrested for driving under the influence (DUI). If you need to use opioids long-term, talk to your care team about driving.    DO NOT operate heave machinery    DO NOT do any other dangerous activities while taking these medicines.     DO NOT drink any alcohol while taking these medicines.      If the opioid prescribed includes acetaminophen, DO NOT take with any other medicines that contain acetaminophen. Read all labels carefully. Look for the word  acetaminophen  or  Tylenol.  Ask your pharmacist if you have questions or are unsure.    You can get addicted to pain medicines, especially if you have a history of addiction (chemical, alcohol or substance dependence). Talk to your care team about ways to reduce this risk.    Store your pills in a secure place, locked if possible.  We will not replace any lost or stolen medicine. If you don t finish your medicine, please throw away (dispose) as directed by your pharmacist. The [place] Pollution Control Agency has more information about safe disposal: https://www.pca.[facility].mn.us/living-green/managing-unwanted-medications.     All opioids tend to cause constipation. Drink plenty of water and eat foods that have a lot of fiber, such as fruits, vegetables, prune juice, apple juice and high-fiber cereal. Take a laxative (Miralax, milk of magnesia, Colace, Senna) if you don t move your bowels at least every other day.          Primary Care Provider Office Phone # Fax #    Magaly Hillman [phone]       [address] [address]        Equal Access to Services     CHARLOTTE GRANDE : Esperanza crooks Sonlea, waaxda danoqeleuterio, qaybta kaallinda garsia, sergey cat . So [facility] [phone].    ATENCIÓN: Si habla español, tiene a farmer disposición servicios gratuitos de asistencia lingüística. Gali al [phone].    We comply with applicable federal civil rights laws and [place] laws. We do not discriminate on the basis of race, color, national origin, age, disability, sex, sexual orientation, or gender identity.            Thank you!     Thank you for choosing [facility]  for your care. Our goal is always to provide you with excellent care. Hearing back from our patients is one way we can continue to improve our services. Please take a few minutes to complete the written survey that you may receive in the mail after your visit with us. Thank you!             Your Updated Medication List - Protect others around you: Learn how to safely use, store and throw away your medicines at www.disposemymeds.org.          This list is accurate as of 6/21/18  2:47 PM.  Always use your most recent med list.                   Brand Name Dispense  Instructions for use Diagnosis    acetaminophen 325 MG tablet    TYLENOL    40 tablet    Take 1 tablet (325 mg) by mouth every 4 hours    Status post knee surgery       ASPIRIN PO      Take 325 mg by mouth daily        ATIVAN 1 MG tablet   Generic drug:  LORazepam      Take 1 mg by mouth every 6 hours as needed for anxiety        bisacodyl 5 MG EC tablet    DULCOLAX     Take 5 mg by mouth daily as needed for constipation        CALCIUM 600+D 600-200 MG-UNIT Tabs   Generic drug:  calcium carbonate-vitamin D           CoQ10 100 MG Caps       S/P total knee arthroplasty, left       docusate sodium 100 MG capsule    COLACE     Take 100 mg by mouth 2 times daily as needed for constipation        FIBER PO      Take 1 tablet by mouth daily    S/P total knee arthroplasty, left       fish Oil 1200 MG capsule      Take 1,200 mg by mouth daily    S/P total knee arthroplasty, left       GAVISCON EXTRA STRENGTH 160-105 MG Chew   Generic drug:  Alum hydroxide-Mag carbonate      Take by mouth At Bedtime        HYDROcodone-acetaminophen 5-325 MG per tablet    NORCO    15 tablet    Take 1 tablet by mouth every 4 hours as needed for other (Moderate to Severe Pain)    Status post knee surgery       magnesium 250 MG tablet      Take 1 tablet by mouth daily        PANTOPRAZOLE SODIUM PO      Take 40 mg by mouth 2 times daily    S/P total knee arthroplasty, left       PAXIL PO      Take 20 mg by mouth daily        rosuvastatin 10 MG tablet    CRESTOR     Take 10 mg by mouth daily    S/P total knee arthroplasty, left       SIMVASTATIN PO      Take 40 mg by mouth At Bedtime        SYNTHROID PO      Take 200 mcg by mouth daily        traZODone 50 MG tablet    DESYREL     Take 50 mg by mouth At Bedtime    S/P total knee arthroplasty, left       VITAMIN D (CHOLECALCIFEROL) PO      Take 1,000 Units by mouth daily    S/P total knee arthroplasty, left

## 2018-06-21 NOTE — PROGRESS NOTES
Reason for visit:    Carmita Gautam came in to the clinic for a two week post op check.    Her surgery was done 6/8/2018 by Dr Thomas.  She had Left knee scope, multiple synovial biopsies and subtotal synovectomy.     Assessment:    Carmita came into the clinic ambulating independently.  She came with her  today.    The Surgical wounds were exposed and found to be healing without evidence of infection; so the sutures were removed without difficulty.  Steristrips applied to suture sites.    Patient c/o sharp pain behind her knee and behind her thigh.  She feels that this has gotten worse over the past two weeks.  She also has moderate about of bruising on the lateral side of her thigh.    I did order an ultrasound for Carmita today to rule out DVT.  She will get this done at the [facility] and Dr. Thomas will be updated when this is completed.    Patient does complain of high pain levels at night.  She has only taken norco about 6 times and was afraid to continue this due to only being given 12 tabs at discharge.  I did fill a refill for her today for 15 additional tabs if she needs them.      Plan:She will continue PT in [place].  She has a follow up with Dr. Thomas in July.      She has our phone number and will call with questions or problems.

## 2018-06-21 NOTE — PROGRESS NOTES
Ultrasound negative for DVT.  Voicemail left for patient.  She can call back with any further questions or concerns.

## 2018-07-17 ENCOUNTER — OFFICE VISIT (OUTPATIENT)
Dept: ORTHOPEDICS | Facility: CLINIC | Age: 73
End: 2018-07-17
Payer: COMMERCIAL

## 2018-07-17 DIAGNOSIS — Z98.890 S/P KNEE SURGERY: ICD-10-CM

## 2018-07-17 DIAGNOSIS — M65.962 SYNOVITIS OF LEFT KNEE: Primary | ICD-10-CM

## 2018-07-17 RX ORDER — AMOXICILLIN 500 MG/1
2000 TABLET, FILM COATED ORAL ONCE
Qty: 4 TABLET | Refills: 3 | Status: SHIPPED | OUTPATIENT
Start: 2018-07-17 | End: 2018-07-17

## 2018-07-17 RX ADMIN — TRIAMCINOLONE ACETONIDE 40 MG: 40 INJECTION, SUSPENSION INTRA-ARTICULAR; INTRAMUSCULAR at 12:43

## 2018-07-17 RX ADMIN — LIDOCAINE HYDROCHLORIDE 9 ML: 10 INJECTION, SOLUTION EPIDURAL; INFILTRATION; INTRACAUDAL; PERINEURAL at 12:43

## 2018-07-17 ASSESSMENT — ENCOUNTER SYMPTOMS
ORTHOPNEA: 0
NERVOUS/ANXIOUS: 1
MYALGIAS: 0
PANIC: 1
PALPITATIONS: 0
EYE PAIN: 0
DECREASED APPETITE: 0
WEIGHT LOSS: 0
JAUNDICE: 0
JOINT SWELLING: 1
FATIGUE: 0
STIFFNESS: 0
INSOMNIA: 0
HALLUCINATIONS: 0
CONSTIPATION: 0
LEG PAIN: 0
CHILLS: 0
COUGH: 0
HEMOPTYSIS: 0
MUSCLE CRAMPS: 1
DYSPNEA ON EXERTION: 0
BLOOD IN STOOL: 0
SYNCOPE: 0
BACK PAIN: 0
DIARRHEA: 0
EYE REDNESS: 0
BLOATING: 0
HEARTBURN: 1
HYPERTENSION: 0
WHEEZING: 0
ARTHRALGIAS: 1
POLYDIPSIA: 0
NIGHT SWEATS: 1
NAUSEA: 0
POSTURAL DYSPNEA: 0
EXERCISE INTOLERANCE: 0
FEVER: 0
LIGHT-HEADEDNESS: 1
WEIGHT GAIN: 0
EYE IRRITATION: 0
ABDOMINAL PAIN: 0
BOWEL INCONTINENCE: 0
NECK PAIN: 0
RECTAL PAIN: 1
INCREASED ENERGY: 0
POLYPHAGIA: 0
ALTERED TEMPERATURE REGULATION: 0
SHORTNESS OF BREATH: 1
MUSCLE WEAKNESS: 0
DEPRESSION: 0
SNORES LOUDLY: 1
COUGH DISTURBING SLEEP: 0
SPUTUM PRODUCTION: 1
VOMITING: 0
SLEEP DISTURBANCES DUE TO BREATHING: 0
DECREASED CONCENTRATION: 0
DOUBLE VISION: 0
HYPOTENSION: 0

## 2018-07-17 NOTE — PROGRESS NOTES
Large Joint Injection/Arthocentesis  Date/Time: 7/17/2018 12:43 PM  Performed by: IVANA BUCKNER  Authorized by: IVANA BUCKNER     Indications:  Pain and osteoarthritis  Needle Size:  21 G  Guidance: landmark guided    Approach:  Medial  Location:  Knee  Site:  L knee joint  Medications:  40 mg triamcinolone acetonide 40 MG/ML; 9 mL lidocaine (PF) 1 %  Medications comment:  The following medication was given:     MEDICATION:  Kenalog 40 mg    Was there drug waste? No  Multi-dose vial: No      MEDICATION:  Lidocaine without epinephrine    Was there drug waste? Yes  Amount of drug waste (mL): 21.  Reason for waste:  Single use vial  Multi-dose vial: No      Kirsten Escobedo ATC  July 17, 2018  Procedure discussed: discussed risks, benefits, and alternatives    Consent Given by:  Patient  Timeout: timeout called immediately prior to procedure    Prep: patient was prepped and draped in usual sterile fashion     HISTORY OF PRESENT ILLNESS:  Carmita Gautam is a 73 year old female with knee pain.  Diagnosis is knee arthritis supported by history, physical exam, and imaging.    PROCEDURE:  After informed verbal and writtten consent, under sterile conditions, patient's left knee was injected with 9 cc of Lidocaine and 1 cc of Kenalog (40 mg/ml).   The injection was done by Dr. Buckner.    Patient had good pain relief upon leaving the clinic, and will follow up with us on an as needed basis.

## 2018-07-17 NOTE — LETTER
7/17/2018       RE: Carmita Gautam  [address] [facility] [place] [address]     Dear Colleague,    Thank you for referring your patient, Carmita Gautam, to the [facility]. Please see a copy of my visit note below.    Large Joint Injection/Arthocentesis  Date/Time: 7/17/2018 12:43 PM  Performed by: IVANA THOMAS  Authorized by: IVANA THOMAS     Indications:  Pain and osteoarthritis  Needle Size:  21 G  Guidance: landmark guided    Approach:  Medial  Location:  Knee  Site:  L knee joint  Medications:  40 mg triamcinolone acetonide 40 MG/ML; 9 mL lidocaine (PF) 1 %  Medications comment:  The following medication was given:     MEDICATION:  Kenalog 40 mg    Was there drug waste? No  Multi-dose vial: No      MEDICATION:  Lidocaine without epinephrine    Was there drug waste? Yes  Amount of drug waste (mL): 21.  Reason for waste:  Single use vial  Multi-dose vial: No      Kirsten Escobedo ATC  July 17, 2018  Procedure discussed: discussed risks, benefits, and alternatives    Consent Given by:  Patient  Timeout: timeout called immediately prior to procedure    Prep: patient was prepped and draped in usual sterile fashion     HISTORY OF PRESENT ILLNESS:  Carmita Gautam is a 73 year old female with knee pain.  Diagnosis is knee arthritis supported by history, physical exam, and imaging.    PROCEDURE:  After informed verbal and writtten consent, under sterile conditions, patient's left knee was injected with 9 cc of Lidocaine and 1 cc of Kenalog (40 mg/ml).   The injection was done by Dr. Thomas.    Patient had good pain relief upon leaving the clinic, and will follow up with us on an as needed basis.            HISTORY OF PRESENT ILLNESS:  The patient returns now approximately 6 weeks status post an arthroscopy of her left knee with synovial biopsies and a subtotal arthroscopic synovectomy.  The biopsy came back consistent  with hemosiderin synovitis, but there was no nodular solid growth of monocytic cells or osteoblasts like giant cells as seen in PVNS.  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       The patient had a similar path report in 2012 when we did a total knee on her right side.  This right knee swelling abated shortly after the surgery.  However, the patient did remind me that she had 1-2 blood transfusions at that time and that she bled more than was expected.       She has no other findings of a bleeding disorder.  She does not bleed when she brushes her teeth.  She did have heavy periods in her lifetime, but she also had a diagnosis of endometriosis.        After the arthroscopic synovectomy, now 6 weeks later she feels that on the positive side she has less swelling.  On the negative side, she continues to feel fullness in the back of her knee and she feels that her knee is more crunchy.  However, taking all this under consideration, she feels that perhaps she is a little bit better.  Pain is not her primary problem, but more just an omnipresent discomfort.       PHYSICAL EXAMINATION:  Physical exam today of the patient's left knee reveals a woman of lean body build.       Examination of the patient's left knee reveals crepitus to active extension that is felt more along the two gutters.  No free fluid in the joint today.  Full range of motion.       ASSESSMENT:  After discussing the situation with Alexandre Carcamo, he felt that a workup for coagulopathy would be the next reasonable step.        I felt that prior to doing this or in conjunction doing this, we should inject her knee with steroids because I do see some improvement in her knee condition since the surgery of partial synovectomy.  Perhaps the intra-articular steroid injection can quiet down her crepitus which is a bother to her.  Therefore, we will inject her knee with steroids today and refer her to Jorge Miles for  an initial workup in the coagulation clinic.  This was a postop visit.           Again, thank you for allowing me to participate in the care of your patient.      Sincerely,    Vaishali Thomas MD

## 2018-07-17 NOTE — PROGRESS NOTES
HISTORY OF PRESENT ILLNESS:  The patient returns now approximately 6 weeks status post an arthroscopy of her left knee with synovial biopsies and a subtotal arthroscopic synovectomy.  The biopsy came back consistent with hemosiderin synovitis, but there was no nodular solid growth of monocytic cells or osteoblasts like giant cells as seen in PVNS.  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       The patient had a similar path report in 2012 when we did a total knee on her right side.  This right knee swelling abated shortly after the surgery.  However, the patient did remind me that she had 1-2 blood transfusions at that time and that she bled more than was expected.       She has no other findings of a bleeding disorder.  She does not bleed when she brushes her teeth.  She did have heavy periods in her lifetime, but she also had a diagnosis of endometriosis.        After the arthroscopic synovectomy, now 6 weeks later she feels that on the positive side she has less swelling.  On the negative side, she continues to feel fullness in the back of her knee and she feels that her knee is more crunchy.  However, taking all this under consideration, she feels that perhaps she is a little bit better.  Pain is not her primary problem, but more just an omnipresent discomfort.       PHYSICAL EXAMINATION:  Physical exam today of the patient's left knee reveals a woman of lean body build.       Examination of the patient's left knee reveals crepitus to active extension that is felt more along the two gutters.  No free fluid in the joint today.  Full range of motion.       ASSESSMENT:  After discussing the situation with Alexandre Carcamo, he felt that a workup for coagulopathy would be the next reasonable step.        I felt that prior to doing this or in conjunction doing this, we should inject her knee with steroids because I do see some improvement in her knee condition since the  surgery of partial synovectomy.  Perhaps the intra-articular steroid injection can quiet down her crepitus which is a bother to her.  Therefore, we will inject her knee with steroids today and refer her to Jorge Miles for an initial workup in the coagulation clinic.  This was a postop visit.

## 2018-07-17 NOTE — MR AVS SNAPSHOT
After Visit Summary   7/17/2018    Carmita Gautam    MRN: [id number]           Patient Information     Date Of Birth          1945        Visit Information        Provider Department      7/17/2018 11:30 AM Vaishali Thomas MD [facility]        Today's Diagnoses     Synovitis of left knee    -  1    S/P knee surgery           Follow-ups after your visit        Follow-up notes from your care team     Return if symptoms worsen or fail to improve.      Who to contact     Please call your clinic at [phone] to:    Ask questions about your health    Make or cancel appointments    Discuss your medicines    Learn about your test results    Speak to your doctor            Additional Information About Your Visit        Care EveryWhere ID     This is your Care EveryWhere ID. This could be used by other organizations to access your [place] medical records  [id number]         Blood Pressure from Last 3 Encounters:   06/08/18 138/80   09/07/15 110/49    Weight from Last 3 Encounters:   06/08/18 70.5 kg (155 lb 6.4 oz)   05/01/18 70.8 kg (156 lb)   09/03/15 73.1 kg (161 lb 3.2 oz)              We Performed the Following     Large Joint Injection/Arthocentesis          Today's Medication Changes          These changes are accurate as of 7/17/18 11:59 PM.  If you have any questions, ask your nurse or doctor.               Start taking these medicines.        Dose/Directions    amoxicillin 500 MG tablet   Commonly known as:  AMOXIL        Dose:  2000 mg   Take 4 tablets (2,000 mg) by mouth once for 1 dose To be taken one hour prior to any dental appointment or dental procedure   Quantity:  4 tablet   Refills:  3            Where to get your medicines      These medications were sent to [facility] - [address]  [address] [place] [address]     Phone:  [phone]     amoxicillin 500 MG tablet                Primary Care Provider Office Phone # Fax #     Magaly Hillman [phone]       [address] [address]        Equal Access to Services     CHARLOTTE GRANDE : Esperanza blas wylie daksha [place], wastevenda luqeleuterio, qaybta kasivanda sun, sergey travis fidelrick ramirez laDavejeremy garcia. So [facility] [phone].    ATENCIÓN: Si habla español, tiene a farmer disposición servicios gratuitos de asistencia lingüística. Gali al [phone].    We comply with applicable federal civil rights laws and [place] laws. We do not discriminate on the basis of race, color, national origin, age, disability, sex, sexual orientation, or gender identity.            Thank you!     Thank you for choosing [facility]  for your care. Our goal is always to provide you with excellent care. Hearing back from our patients is one way we can continue to improve our services. Please take a few minutes to complete the written survey that you may receive in the mail after your visit with us. Thank you!             Your Updated Medication List - Protect others around you: Learn how to safely use, store and throw away your medicines at www.disposemymeds.org.          This list is accurate as of 7/17/18 11:59 PM.  Always use your most recent med list.                   Brand Name Dispense Instructions for use Diagnosis    acetaminophen 325 MG tablet    TYLENOL    40 tablet    Take 1 tablet (325 mg) by mouth every 4 hours    Status post knee surgery       amoxicillin 500 MG tablet    AMOXIL    4 tablet    Take 4 tablets (2,000 mg) by mouth once for 1 dose To be taken one hour prior to any dental appointment or dental procedure        ASPIRIN PO      Take 325 mg by mouth daily        ATIVAN 1 MG tablet   Generic drug:  LORazepam      Take 1 mg by mouth every 6 hours as needed for anxiety        bisacodyl 5 MG EC tablet    DULCOLAX     Take 5 mg by mouth daily as needed for constipation        CALCIUM 600+D 600-200 MG-UNIT Tabs    Generic drug:  calcium carbonate-vitamin D           CoQ10 100 MG Caps       S/P total knee arthroplasty, left       docusate sodium 100 MG capsule    COLACE     Take 100 mg by mouth 2 times daily as needed for constipation        FIBER PO      Take 1 tablet by mouth daily    S/P total knee arthroplasty, left       fish Oil 1200 MG capsule      Take 1,200 mg by mouth daily    S/P total knee arthroplasty, left       GAVISCON EXTRA STRENGTH 160-105 MG Chew   Generic drug:  Alum hydroxide-Mag carbonate      Take by mouth At Bedtime        HYDROcodone-acetaminophen 5-325 MG per tablet    NORCO    15 tablet    Take 1 tablet by mouth every 4 hours as needed for other (Moderate to Severe Pain)    Status post knee surgery       magnesium 250 MG tablet      Take 1 tablet by mouth daily        PANTOPRAZOLE SODIUM PO      Take 40 mg by mouth 2 times daily    S/P total knee arthroplasty, left       PAXIL PO      Take 20 mg by mouth daily        rosuvastatin 10 MG tablet    CRESTOR     Take 10 mg by mouth daily    S/P total knee arthroplasty, left       SIMVASTATIN PO      Take 40 mg by mouth At Bedtime        SYNTHROID PO      Take 200 mcg by mouth daily        traZODone 50 MG tablet    DESYREL     Take 50 mg by mouth At Bedtime    S/P total knee arthroplasty, left       VITAMIN D (CHOLECALCIFEROL) PO      Take 1,000 Units by mouth daily    S/P total knee arthroplasty, left

## 2018-07-17 NOTE — NURSING NOTE
Reason For Visit:   Chief Complaint   Patient presents with     Surgical Followup     DOS 6/8/18 S/P Left Knee Synovial Biopsy, Subtotal Synovectomy (Arthroscopic)       Primary MD: Magaly Hillman  Referring MD: TYRONE Vaughan pt      ?  No      Occupation: Retired.  Currently working? No.  Work status?  Retired.      Date of injury: None  Type of injury: Ongoing.      Date of surgery: 9/3/15  Type of surgery: Total knee arthoplasty left      Date of surgery: 5/10/09  Type of surgery:   1.  Right total knee arthroplasty using [employer] system.   2.  Subtotal synovectomy with biopsies (path pending)    3.  Removal of anterior varicosities x3.     Date of surgery: 6/8/18  Type of surgery: Left Knee Synovial Biopsy, Subtotal Synovectomy (Arthroscopic)       Smoker: No    There were no vitals taken for this visit.    Pain Assessment  Patient Currently in Pain: Yes  0-10 Pain Scale: 2  Pain Orientation: Left  Pain Descriptors: Discomfort  Aggravating Factors: Movement

## 2018-07-19 ENCOUNTER — TELEPHONE (OUTPATIENT)
Dept: ORTHOPEDICS | Facility: CLINIC | Age: 73
End: 2018-07-19

## 2018-07-19 RX ORDER — LIDOCAINE HYDROCHLORIDE 10 MG/ML
9 INJECTION, SOLUTION EPIDURAL; INFILTRATION; INTRACAUDAL; PERINEURAL
Status: DISCONTINUED | OUTPATIENT
Start: 2018-07-17 | End: 2018-08-07

## 2018-07-19 RX ORDER — TRIAMCINOLONE ACETONIDE 40 MG/ML
40 INJECTION, SUSPENSION INTRA-ARTICULAR; INTRAMUSCULAR
Status: DISCONTINUED | OUTPATIENT
Start: 2018-07-17 | End: 2018-08-07

## 2018-07-19 NOTE — TELEPHONE ENCOUNTER
Patient called and informed about hematology consult.  Consult ordered.  Patient to see EARL Morrissey.  Patient verbalized understanding.  She was given hematology clinic phone number to call and schedule an appointment.

## 2018-08-07 ENCOUNTER — OFFICE VISIT (OUTPATIENT)
Dept: HEMATOLOGY | Facility: CLINIC | Age: 73
End: 2018-08-07
Attending: ORTHOPAEDIC SURGERY
Payer: MEDICARE

## 2018-08-07 VITALS
DIASTOLIC BLOOD PRESSURE: 71 MMHG | WEIGHT: 158.4 LBS | TEMPERATURE: 97.6 F | OXYGEN SATURATION: 97 % | BODY MASS INDEX: 26.39 KG/M2 | HEART RATE: 60 BPM | SYSTOLIC BLOOD PRESSURE: 123 MMHG | HEIGHT: 65 IN

## 2018-08-07 DIAGNOSIS — M65.962 SYNOVITIS OF LEFT KNEE: ICD-10-CM

## 2018-08-07 DIAGNOSIS — D69.9 HEMORRHAGIC DIATHESIS (H): Primary | ICD-10-CM

## 2018-08-07 LAB
APTT PPP: 25 SEC (ref 22–37)
BASOPHILS # BLD AUTO: 0 10E9/L (ref 0–0.2)
BASOPHILS NFR BLD AUTO: 0.4 %
CLOSURE TME COLL+EPINEP BLD: 136 SEC
DIFFERENTIAL METHOD BLD: NORMAL
EOSINOPHIL # BLD AUTO: 0.1 10E9/L (ref 0–0.7)
EOSINOPHIL NFR BLD AUTO: 1.3 %
ERYTHROCYTE [DISTWIDTH] IN BLOOD BY AUTOMATED COUNT: 12.5 % (ref 10–15)
HCT VFR BLD AUTO: 42.6 % (ref 35–47)
HGB BLD-MCNC: 13.7 G/DL (ref 11.7–15.7)
IMM GRANULOCYTES # BLD: 0 10E9/L (ref 0–0.4)
IMM GRANULOCYTES NFR BLD: 0 %
INR PPP: 0.88 (ref 0.86–1.14)
LYMPHOCYTES # BLD AUTO: 2 10E9/L (ref 0.8–5.3)
LYMPHOCYTES NFR BLD AUTO: 42.8 %
MCH RBC QN AUTO: 30.9 PG (ref 26.5–33)
MCHC RBC AUTO-ENTMCNC: 32.2 G/DL (ref 31.5–36.5)
MCV RBC AUTO: 96 FL (ref 78–100)
MONOCYTES # BLD AUTO: 0.4 10E9/L (ref 0–1.3)
MONOCYTES NFR BLD AUTO: 7.5 %
NEUTROPHILS # BLD AUTO: 2.2 10E9/L (ref 1.6–8.3)
NEUTROPHILS NFR BLD AUTO: 48 %
NRBC # BLD AUTO: 0 10*3/UL
NRBC BLD AUTO-RTO: 0 /100
PLATELET # BLD AUTO: 279 10E9/L (ref 150–450)
RBC # BLD AUTO: 4.43 10E12/L (ref 3.8–5.2)
WBC # BLD AUTO: 4.7 10E9/L (ref 4–11)

## 2018-08-07 PROCEDURE — 85247 CLOT FACTOR VIII MULTIMETRIC: CPT | Performed by: PHYSICIAN ASSISTANT

## 2018-08-07 PROCEDURE — 85610 PROTHROMBIN TIME: CPT | Performed by: PHYSICIAN ASSISTANT

## 2018-08-07 PROCEDURE — 85245 CLOT FACTOR VIII VW RISTOCTN: CPT | Performed by: PHYSICIAN ASSISTANT

## 2018-08-07 PROCEDURE — 00000401 ZZHCL STATISTIC THROMBIN TIME NC: Performed by: PHYSICIAN ASSISTANT

## 2018-08-07 PROCEDURE — 00000447 ZZHCL STATISTIC VON WILLEBRAND MULTIMERS: Performed by: PHYSICIAN ASSISTANT

## 2018-08-07 PROCEDURE — G0463 HOSPITAL OUTPT CLINIC VISIT: HCPCS

## 2018-08-07 PROCEDURE — 85240 CLOT FACTOR VIII AHG 1 STAGE: CPT | Performed by: PHYSICIAN ASSISTANT

## 2018-08-07 PROCEDURE — 85576 BLOOD PLATELET AGGREGATION: CPT | Performed by: PHYSICIAN ASSISTANT

## 2018-08-07 PROCEDURE — 85730 THROMBOPLASTIN TIME PARTIAL: CPT | Performed by: PHYSICIAN ASSISTANT

## 2018-08-07 PROCEDURE — 85025 COMPLETE CBC W/AUTO DIFF WBC: CPT | Performed by: PHYSICIAN ASSISTANT

## 2018-08-07 PROCEDURE — 99213 OFFICE O/P EST LOW 20 MIN: CPT | Performed by: PHYSICIAN ASSISTANT

## 2018-08-07 PROCEDURE — 85246 CLOT FACTOR VIII VW ANTIGEN: CPT | Performed by: PHYSICIAN ASSISTANT

## 2018-08-07 ASSESSMENT — PAIN SCALES - GENERAL: PAINLEVEL: NO PAIN (0)

## 2018-08-07 NOTE — PROGRESS NOTES
[facility]  [address] [place] [address]  Main: [phone], Fax: [phone]    [facility] seen at: [facility] at [address] [place] [address].    Outpatient Visit Note:    Patient: Carmita Gautam  MRN: [id number]  : 1945  ANATOLY: 2018    Reason:  Concern for possible bleeding disorders given recent and previous knee joint findings of moderate amount of hemosiderin stained synovium.     HPI:  Carmita is a 73 year old female who has had numerous bilateral knees surgeries, who has a history of bloody effusion of the right knee joint as well as recent findings of moderate amount of hemosiderin strained synovium during recent subtotal synovectomy surgery on the left knee by Dr. Vaishali Thomas, referred to our clinic by Dr. Thomas for evaluation of possible bleeding disorders. Carmita reports no family or personal history of diagnosis of a bleeding disorders.     Carmita is a poor historian and was confused about the timing of events in regard to her right knee and her left knee surgical procedures.     From what I can gather via chart review, Carmita underwent a right total knee arthroplasty back in May 2009 by Dr. Thomas. At the time of the surgery in , Dr. Thomas surprisingly found significant amount of blood tinge synovial fluid along with invasive pigmented synovitis throughout the knee. 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 She was placed on Coumadin for 2 weeks post operatively for DVT/PE prophylaxis. However, subsequent pathology report on the biopsied tissue came back with hemosiderin laden synovium that did not fit the pathologic criteria of PVNS.     Then back in 2015, she underwent left total knee arthroplasty without any unusual findings. Since her left TKA, she reports on and off swelling  and pain intermittently within the left knee. She again saw Dr. Thomas back in 5/1/2018 in clinic for which Dr. Thomas performed a bedside aspiration of the left knee. With the aspiration, about 50 mL of red fluid that did not clot. It was felt by Dr. Thomas that the consistency of the fluid is suggestive of PVNS. An subsequent MRI showed moderate size left knee joint effusion with mild tenosynovitis. Eventually, Dr. Thomas recommended arthroscopic examination of her knee with biopsy of the left knee synovium and subtotal synovectomy. This was done on 6/8/2018. The left knee synovial biopsy reported came back consistent with hemosiderin synovitis without nodular solid growth of monocytic cells or osteoblasts like giant cells as seen in PVNS. Hence Dr. 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     With all these findings and after Dr. Thomas discussion with Dr. Alexandre Carcamo, the patient is referred to this writer for initial evaluation of possible bleeding disorders.     Bleeding history:  Today, I have taken an extensive bleeding history of this patient. Carmita reports that her menarche was at age 14. She recalls that her menstrual period used to be quite heavy and to the point where she had an open hysterectomy and partial oophorectomy done back when she was 32 years of age. But also noted that she did have endometriosis at the time. Prior to that, she did have 4 vaginal deliveries with no reports of any bleeding complications. She underwent partial thyroidectomy in her 30's for thyroid nodule without any bleeding complications. Has had numerous arthroscopic knee surgeries in the 1980's without bleeding complications. She had Dupuytren contracture release surgery of her hand about 1.5 year ago with no bleeding complications. Had numerous left foot bone spur surgeries in the past without any bleeding complications. She has had right shoulder torn tendon repair surgery  about 4 years ago without any bleeding complications. She had her tonsillectomy done back when she was 5-6 years of age with no reports of bleeding complications. She also underwent extensive complicated ear drum replacement surgery on the right back in 1970's without report of any bleeding complications.     She denies any frequent epistaxis. She does endorse easy bruising (note that she is on a daily Aspirin and Fish Oil supplement). In fact, she reports that she currently has a bruise that is the size of a tennis ball over the lateral side of her left thigh after she hit the corner of a table about one week ago.     Denies any gum bleeding. Denies any hematuria. She does endorse some bleeding per rectum with known history of hemorrhoid. She recalls the last time she notice bright red blood in the toilet bowl was about one month ago.     Family History:  She has 2 sons and 2 daughters with her oldest daughter underwent hysterectomy in her 30's for unclear reasons. None of her children have any history of bleeding diathesis. Carmita reports that she has 9 siblings and she is not aware of any bleeding disorders within the family. Her parents lives until their 90's with no history of bleeding disorders.     Medications:  Current Outpatient Prescriptions   Medication     acetaminophen (TYLENOL) 325 MG tablet     Alum hydroxide-Mag carbonate (GAVISCON EXTRA STRENGTH) 160-105 MG CHEW     ASPIRIN PO     calcium carbonate-vitamin D (CALCIUM 600+D) 600-200 MG-UNIT TABS     Coenzyme Q10 (COQ10) 100 MG CAPS     docusate sodium (COLACE) 100 MG capsule     FIBER PO     Levothyroxine Sodium (SYNTHROID PO)     LORazepam (ATIVAN) 1 MG tablet     magnesium 250 MG tablet     Omega-3 Fatty Acids (FISH OIL) 1200 MG capsule     PANTOPRAZOLE SODIUM PO     PARoxetine HCl (PAXIL PO)     rosuvastatin (CRESTOR) 10 MG tablet     traZODone (DESYREL) 50 MG tablet     VITAMIN D, CHOLECALCIFEROL, PO     No current facility-administered  "medications for this visit.      Facility-Administered Medications Ordered in Other Visits   Medication     bupivacaine 0.5 % -  EPINEPHrine 1:200,000 injection     Allergies and PmHx:  Reviewed by myself in the electronic medical records.    Social History:  Deferred.    ROS:  As above. She does report left knee pain occasionally and crepitus with range of motion of the left knee. Denies any right knee pain. She does complaint of left shoulder pain occasionally. Elbow, hip and ankles are without any pain or swelling. Denies fever. No shortness of breath. No chest pain.     Objective:  Pleasant in no acute distress.  Vitals: /71 (BP Location: Left arm)  Pulse 60  Temp 97.6  F (36.4  C) (Oral)  Ht 1.651 m (5' 5\")  Wt 71.8 kg (158 lb 6.4 oz)  SpO2 97%  BMI 26.36 kg/m2  Exam:   There is an area of tennis ball size ecchymosis over the lateral side of her left thigh and a smaller ecchymotic area over on her left hip. No induration of these areas.    Left knee with crepitus mostly on the lateral side of her knee with range of motion. No obvious effusion or swelling of either knee joints.     Both lower extremities shows significant varicosities but no swelling or open ulcerations.    Labs:  No recent coagulation testing done.    Imaging:  MRI left knee 5/8/2018:  Moderate size left knee joint effusion with mild tenosynovitis, nonspecific.    Assessment:  In summary, Carmita is a 73 year old female with a history of bilateral knee findings of hemosiderin deposit synovitis that is not consistent or fit the diagnostic criteria for PVNS, referred by Dr. Thomas for evaluation of possible bleeding disorders.     In taking an extensive bleeding history, Carmita has had numerous surgical challenges in the past as well as 4 vaginal deliveries without any bleeding complications. Also without a family history of bleeding disorders, it is highly unlikely that she has a congenital or inherited bleeding disorder. The " fact that she is on a daily Aspirin should not cause hemosiderin deposit synovitis or any acute hemarthrosis. Also her history is not consistent with Von Willebrand's Disease or any acquired bleeding disorders.     Diagnosis:  1. History of bilateral knees hemosiderin deposit synovitis.     Plan:  I will initiate obtaining some basic coagulation testing today including INR, PTT, Von Willebrand profile, Factor 8 Assay, CBC and platelet function screening tests. Again I do think that the yield of these tests are quite low as her personal and family history are not consistent with any bleeding disorders other than the abnormal synovium findings.     I will discuss the case with Dr. Francisco Perez once these test results are back.       Jorge Miles PA-C, MPAS  Physician Assistant  [facility].     Addendum 8/22/2018 at 14:00:    Component      Latest Ref Rng & Units 8/7/2018   WBC      4.0 - 11.0 10e9/L 4.7   RBC Count      3.8 - 5.2 10e12/L 4.43   Hemoglobin      11.7 - 15.7 g/dL 13.7   Hematocrit      35.0 - 47.0 % 42.6   MCV      78 - 100 fl 96   MCH      26.5 - 33.0 pg 30.9   MCHC      31.5 - 36.5 g/dL 32.2   RDW      10.0 - 15.0 % 12.5   Platelet Count      150 - 450 10e9/L 279   Diff Method       Automated Method   % Neutrophils      % 48.0   % Lymphocytes      % 42.8   % Monocytes      % 7.5   % Eosinophils      % 1.3   % Basophils      % 0.4   % Immature Granulocytes      % 0.0   Nucleated RBCs      0 /100 0   Absolute Neutrophil      1.6 - 8.3 10e9/L 2.2   Absolute Lymphocytes      0.8 - 5.3 10e9/L 2.0   Absolute Monocytes      0.0 - 1.3 10e9/L 0.4   Absolute Eosinophils      0.0 - 0.7 10e9/L 0.1   Absolute Basophils      0.0 - 0.2 10e9/L 0.0   Abs Immature Granulocytes      0 - 0.4 10e9/L 0.0   Absolute Nucleated RBC       0.0   INR      0.86 - 1.14 0.88   PTT      22 - 37 sec 25   von Willebrand Antigen      50 - 200 % 230 (H)   Factor 8 Assay       55 - 200 % 136   von Willebrand Factor Activity      50 - 180 % 183 (H)   PLT Funct COL/EPI      <170 sec 136   Ristocetin Cofactor Activity      51 - 215 % 111     Von Willebrand Factor Multimer done on 8/7/2018:  Von Willebrand factor multimeric analysis shows a normal multimeric distribution.     Final assessment:  INR, PTT and platelet function testing all came back within normal range. Von Willebrand panel shows that her Von Willebrand antigen is slightly elevated and her activity level is also slightly elevated which is rather common in elderly patients. This represent a slightly hypercoagulable state and does not pose any bleeding risk.     Thus, this patient has no evidence of any congenital or acquired bleeding disorder. The etiology of her bilateral knees hemosiderin synovitis remains unclear.     I did discuss this case with Dr. Francisco Perez, staff hematologist. He agrees with my interpretation as above.     I will have our nurse clinician, Mindy Liu, contact the patient to communicate the above.       Jorge Miles PA-C, MPAS  Physician Assistant  [facility].

## 2018-08-07 NOTE — MR AVS SNAPSHOT
"              After Visit Summary   8/7/2018    Carmita Gautam    MRN: [id number]           Patient Information     Date Of Birth          1945        Visit Information        Provider Department      8/7/2018 10:30 AM Jorge Miles PA-C [facility]        Today's Diagnoses     Hemorrhagic diathesis (H)    -  1    Synovitis of left knee           Follow-ups after your visit        Follow-up notes from your care team     Return As needed.      Who to contact     If you have questions or need follow up information about today's clinic visit or your schedule please contact [facility] directly at [phone].  Normal or non-critical lab and imaging results will be communicated to you by MyChart, letter or phone within 4 business days after the clinic has received the results. If you do not hear from us within 7 days, please contact the clinic through MyChart or phone. If you have a critical or abnormal lab result, we will notify you by phone as soon as possible.  Submit refill requests through [employer] or call your pharmacy and they will forward the refill request to us. Please allow 3 business days for your refill to be completed.          Additional Information About Your Visit        Care EveryWhere ID     This is your Care EveryWhere ID. This could be used by other organizations to access your [place] medical records  [id number]        Your Vitals Were     Pulse Temperature Height Pulse Oximetry BMI (Body Mass Index)       60 97.6  F (36.4  C) (Oral) 1.651 m (5' 5\") 97% 26.36 kg/m2        Blood Pressure from Last 3 Encounters:   08/07/18 123/71   06/08/18 138/80   09/07/15 110/49    Weight from Last 3 Encounters:   08/07/18 71.8 kg (158 lb 6.4 oz)   06/08/18 70.5 kg (155 lb 6.4 oz)   05/01/18 70.8 kg (156 lb)              We Performed the Following     CBC with platelets differential     Factor 8 assay     INR     Partial thromboplastin " time     Platelet function closure with reflex     Von Willebrand antigen     Von Willebrand multimers     VWF Activity with reflex to Ristocetin Cofactor Activity        Primary Care Provider Office Phone # Fax #    Magaly Hillman [phone]       [address] [address]        Equal Access to Services     CHARLOTTE GRANDE : Hadii blas ku hadasho Soomaali, waaxda luqadaha, qaybta kaalmada adeannaliseda, sergey harden beulahsonali guardado willianjessica cat . So [facility] [phone].    ATENCIÓN: Si habla español, tiene a farmer disposición servicios gratuitos de asistencia lingüística. Gali al [phone].    We comply with applicable federal civil rights laws and [place] laws. We do not discriminate on the basis of race, color, national origin, age, disability, sex, sexual orientation, or gender identity.            Thank you!     Thank you for choosing [facility]  for your care. Our goal is always to provide you with excellent care. Hearing back from our patients is one way we can continue to improve our services. Please take a few minutes to complete the written survey that you may receive in the mail after your visit with us. Thank you!             Your Updated Medication List - Protect others around you: Learn how to safely use, store and throw away your medicines at www.disposemymeds.org.          This list is accurate as of 8/7/18 11:59 PM.  Always use your most recent med list.                   Brand Name Dispense Instructions for use Diagnosis    acetaminophen 325 MG tablet    TYLENOL    40 tablet    Take 1 tablet (325 mg) by mouth every 4 hours    Status post knee surgery       ASPIRIN PO      Take 325 mg by mouth daily        ATIVAN 1 MG tablet   Generic drug:  LORazepam      Take 1 mg by mouth every 6 hours as needed for anxiety        CALCIUM 600+D 600-200 MG-UNIT Tabs   Generic drug:  calcium carbonate-vitamin D            CoQ10 100 MG Caps       S/P total knee arthroplasty, left       docusate sodium 100 MG capsule    COLACE     Take 100 mg by mouth 2 times daily as needed for constipation        FIBER PO      Take 1 tablet by mouth daily    S/P total knee arthroplasty, left       fish Oil 1200 MG capsule      Take 1,200 mg by mouth daily    S/P total knee arthroplasty, left       GAVISCON EXTRA STRENGTH 160-105 MG Chew   Generic drug:  Alum hydroxide-Mag carbonate      Take by mouth At Bedtime        magnesium 250 MG tablet      Take 1 tablet by mouth daily        PANTOPRAZOLE SODIUM PO      Take 40 mg by mouth 2 times daily    S/P total knee arthroplasty, left       PAXIL PO      Take 20 mg by mouth daily        rosuvastatin 10 MG tablet    CRESTOR     Take 10 mg by mouth daily    S/P total knee arthroplasty, left       SYNTHROID PO      Take 200 mcg by mouth daily        traZODone 50 MG tablet    DESYREL     Take 50 mg by mouth At Bedtime    S/P total knee arthroplasty, left       VITAMIN D (CHOLECALCIFEROL) PO      Take 2,000 Units by mouth daily    S/P total knee arthroplasty, left

## 2018-08-08 LAB
FACT VIII ACT/NOR PPP: 136 % (ref 55–200)
VWF CBA/VWF AG PPP IA-RTO: 230 % (ref 50–200)
VWF:AC ACT/NOR PPP IA: 183 % (ref 50–180)

## 2018-08-09 LAB — VWF MULTIMERS PPP QL: NORMAL

## 2018-08-09 NOTE — NURSING NOTE
Carmita Gautam is here for new consult visit.  She is  being seen for history of hemarthrosis.       Welcomed and introduced her to our center and provided her with a contact card with our contact phone numbers.    She provided a detailed history of her bleeding challenges which I recorded and shared with the provider..  I did review which provider she was going to see today and the timing of seeing that provider.    Medications and allergies were reviewed, updated and reconciled with outside records.    I thanked her for coming in and encouraged her to call with any concerns or questions.  Our team will call her next week with results.    Mindy Liu, RN - Nurse Clinician - [facility] - [phone]

## 2018-08-10 LAB — VWF:RCO ACT/NOR PPP PL AGG: 111 % (ref 51–215)

## 2018-08-21 LAB — VWF MULTIMERS PPP QL: NORMAL

## 2018-08-22 ENCOUNTER — TELEPHONE (OUTPATIENT)
Dept: HEMATOLOGY | Facility: CLINIC | Age: 73
End: 2018-08-22

## 2018-08-22 NOTE — TELEPHONE ENCOUNTER
Phone call placed to Carmita Gautam to review lab results from her recent visit.  She has had a history of bleeding into her knees.  The bleeding work up was totally negative and our providers do not think that she has an underlying bleeding disorder.  She understood this but is frustrated that she does not have an answer to her knee issues.  She will follow up with Dr. Thomas for next steps.  Mindy Liu, RN - Nurse Clinician - [facility] - [phone]

## 2018-09-14 ENCOUNTER — TRANSFERRED RECORDS (OUTPATIENT)
Dept: HEALTH INFORMATION MANAGEMENT | Facility: CLINIC | Age: 73
End: 2018-09-14

## 2021-09-28 ENCOUNTER — TRANSFERRED RECORDS (OUTPATIENT)
Dept: HEALTH INFORMATION MANAGEMENT | Facility: CLINIC | Age: 76
End: 2021-09-28

## 2021-10-19 ENCOUNTER — TRANSCRIBE ORDERS (OUTPATIENT)
Dept: OTHER | Age: 76
End: 2021-10-19

## 2021-10-19 DIAGNOSIS — H92.01 RIGHT EAR PAIN: Primary | ICD-10-CM

## (undated) DEVICE — Device

## (undated) DEVICE — TUBING SYSTEM ARTHREX PUMP REDEUCE AR-6411

## (undated) DEVICE — PACK ARTHROSCOPY CUSTOM ASC

## (undated) DEVICE — DRSG TELFA 3X8" 1238

## (undated) DEVICE — GLOVE PROTEXIS POWDER FREE SMT 6.5  2D72PT65X

## (undated) DEVICE — LINEN TOWEL PACK X5 5464

## (undated) DEVICE — LINEN GOWN XLG 5407

## (undated) DEVICE — SUCTION MANIFOLD NEPTUNE 2 SYS 4 PORT 0702-020-000

## (undated) DEVICE — GLOVE GAMMEX NEOPRENE ULTRA SZ 6.5 LF 8513

## (undated) DEVICE — TUBING SYSTEM ARTHREX PATIENT REDEUCE AR-6421

## (undated) DEVICE — DRAPE STOCKINETTE IMPERVIOUS 12" 1587

## (undated) DEVICE — SOL NACL 0.9% IRRIG 3000ML BAG 2B7477

## (undated) DEVICE — SU ETHILON 3-0 PS-1 18" 1663G

## (undated) RX ORDER — TRIAMCINOLONE ACETONIDE 40 MG/ML
INJECTION, SUSPENSION INTRA-ARTICULAR; INTRAMUSCULAR
Status: DISPENSED
Start: 2018-07-17

## (undated) RX ORDER — GLYCOPYRROLATE 0.2 MG/ML
INJECTION INTRAMUSCULAR; INTRAVENOUS
Status: DISPENSED
Start: 2018-06-08

## (undated) RX ORDER — BUPIVACAINE HYDROCHLORIDE 2.5 MG/ML
INJECTION, SOLUTION EPIDURAL; INFILTRATION; INTRACAUDAL
Status: DISPENSED
Start: 2018-06-08

## (undated) RX ORDER — MINERAL OIL
OIL (ML) MISCELLANEOUS
Status: DISPENSED
Start: 2018-06-08

## (undated) RX ORDER — OXYCODONE HYDROCHLORIDE 5 MG/1
TABLET ORAL
Status: DISPENSED
Start: 2018-06-08

## (undated) RX ORDER — LIDOCAINE HYDROCHLORIDE 10 MG/ML
INJECTION, SOLUTION EPIDURAL; INFILTRATION; INTRACAUDAL; PERINEURAL
Status: DISPENSED
Start: 2018-05-01

## (undated) RX ORDER — HYDROMORPHONE HYDROCHLORIDE 1 MG/ML
INJECTION, SOLUTION INTRAMUSCULAR; INTRAVENOUS; SUBCUTANEOUS
Status: DISPENSED
Start: 2018-06-08

## (undated) RX ORDER — TRANEXAMIC ACID 100 MG/ML
INJECTION, SOLUTION INTRAVENOUS
Status: DISPENSED
Start: 2018-06-08

## (undated) RX ORDER — FENTANYL CITRATE 50 UG/ML
INJECTION, SOLUTION INTRAMUSCULAR; INTRAVENOUS
Status: DISPENSED
Start: 2018-06-08

## (undated) RX ORDER — TRIAMCINOLONE ACETONIDE 40 MG/ML
INJECTION, SUSPENSION INTRA-ARTICULAR; INTRAMUSCULAR
Status: DISPENSED
Start: 2018-05-01

## (undated) RX ORDER — LIDOCAINE HYDROCHLORIDE 10 MG/ML
INJECTION, SOLUTION EPIDURAL; INFILTRATION; INTRACAUDAL; PERINEURAL
Status: DISPENSED
Start: 2018-07-17

## (undated) RX ORDER — EPHEDRINE SULFATE 50 MG/ML
INJECTION, SOLUTION INTRAMUSCULAR; INTRAVENOUS; SUBCUTANEOUS
Status: DISPENSED
Start: 2018-06-08

## (undated) RX ORDER — CEFAZOLIN SODIUM 1 G/3ML
INJECTION, POWDER, FOR SOLUTION INTRAMUSCULAR; INTRAVENOUS
Status: DISPENSED
Start: 2018-06-08

## (undated) RX ORDER — KETOROLAC TROMETHAMINE 30 MG/ML
INJECTION, SOLUTION INTRAMUSCULAR; INTRAVENOUS
Status: DISPENSED
Start: 2018-06-08

## (undated) RX ORDER — DEXAMETHASONE SODIUM PHOSPHATE 4 MG/ML
INJECTION, SOLUTION INTRA-ARTICULAR; INTRALESIONAL; INTRAMUSCULAR; INTRAVENOUS; SOFT TISSUE
Status: DISPENSED
Start: 2018-06-08

## (undated) RX ORDER — ONDANSETRON 2 MG/ML
INJECTION INTRAMUSCULAR; INTRAVENOUS
Status: DISPENSED
Start: 2018-06-08

## (undated) RX ORDER — TRIAMCINOLONE ACETONIDE 40 MG/ML
INJECTION, SUSPENSION INTRA-ARTICULAR; INTRAMUSCULAR
Status: DISPENSED
Start: 2018-05-22

## (undated) RX ORDER — LIDOCAINE HYDROCHLORIDE 10 MG/ML
INJECTION, SOLUTION EPIDURAL; INFILTRATION; INTRACAUDAL; PERINEURAL
Status: DISPENSED
Start: 2018-05-22

## (undated) RX ORDER — PROPOFOL 10 MG/ML
INJECTION, EMULSION INTRAVENOUS
Status: DISPENSED
Start: 2018-06-08

## (undated) RX ORDER — ACETAMINOPHEN 325 MG/1
TABLET ORAL
Status: DISPENSED
Start: 2018-06-08